# Patient Record
Sex: MALE | Race: WHITE
[De-identification: names, ages, dates, MRNs, and addresses within clinical notes are randomized per-mention and may not be internally consistent; named-entity substitution may affect disease eponyms.]

---

## 2018-06-09 ENCOUNTER — HOSPITAL ENCOUNTER (OUTPATIENT)
Dept: HOSPITAL 61 - ER | Age: 63
Setting detail: OBSERVATION
LOS: 2 days | Discharge: HOME | End: 2018-06-11
Attending: HOSPITALIST | Admitting: HOSPITALIST
Payer: COMMERCIAL

## 2018-06-09 DIAGNOSIS — Z82.49: ICD-10-CM

## 2018-06-09 DIAGNOSIS — E03.9: ICD-10-CM

## 2018-06-09 DIAGNOSIS — K76.0: ICD-10-CM

## 2018-06-09 DIAGNOSIS — N39.0: ICD-10-CM

## 2018-06-09 DIAGNOSIS — N17.0: Primary | ICD-10-CM

## 2018-06-09 DIAGNOSIS — N12: ICD-10-CM

## 2018-06-09 DIAGNOSIS — M19.90: ICD-10-CM

## 2018-06-09 DIAGNOSIS — Z83.3: ICD-10-CM

## 2018-06-09 DIAGNOSIS — I10: ICD-10-CM

## 2018-06-09 DIAGNOSIS — Z79.899: ICD-10-CM

## 2018-06-09 DIAGNOSIS — E46: ICD-10-CM

## 2018-06-09 LAB
ADD MAN DIFF?: NO
ALBUMIN SERPL-MCNC: 3.5 G/DL (ref 3.4–5)
ALBUMIN/GLOB SERPL: 0.7 {RATIO} (ref 1–1.7)
ALP SERPL-CCNC: 138 U/L (ref 46–116)
ALT (SGPT): 26 U/L (ref 16–63)
ANION GAP SERPL CALC-SCNC: 8 MMOL/L (ref 6–14)
AST SERPL-CCNC: 22 U/L (ref 15–37)
BACTERIA,URINE: (no result) /HPF
BASO #: 0 X10^3/UL (ref 0–0.2)
BASO %: 0 % (ref 0–3)
BILIRUBIN,URINE: (no result)
BLOOD UREA NITROGEN: 22 MG/DL (ref 8–26)
BUN/CREAT SERPL: 16 (ref 6–20)
CALCIUM: 8.8 MG/DL (ref 8.5–10.1)
CHLORIDE: 100 MMOL/L (ref 98–107)
CLARITY,URINE: (no result)
CO2 SERPL-SCNC: 29 MMOL/L (ref 21–32)
COLOR,URINE: (no result)
CREAT SERPL-MCNC: 1.4 MG/DL (ref 0.7–1.3)
EOS #: 0 X10^3/UL (ref 0–0.7)
EOS %: 0 % (ref 0–3)
GFR SERPLBLD BASED ON 1.73 SQ M-ARVRAT: 51.5 ML/MIN
GLOBULIN SER-MCNC: 5 G/DL (ref 2.2–3.8)
GLUCOSE SERPL-MCNC: 133 MG/DL (ref 70–99)
GLUCOSE,URINE: NEGATIVE MG/DL
HCG SERPL-ACNC: 11.5 X10^3/UL (ref 4–11)
HEMATOCRIT: 39.6 % (ref 39–53)
HEMOGLOBIN: 13.7 G/DL (ref 13–17.5)
LIPASE: 94 U/L (ref 73–393)
LYMPH #: 0.9 X10^3/UL (ref 1–4.8)
LYMPH %: 8 % (ref 24–48)
MEAN CORPUSCULAR HEMOGLOBIN: 29 PG (ref 25–35)
MEAN CORPUSCULAR HGB CONC: 35 G/DL (ref 31–37)
MEAN CORPUSCULAR VOLUME: 84 FL (ref 79–100)
MONO #: 0.8 X10^3/UL (ref 0–1.1)
MONO %: 7 % (ref 0–9)
NEUT #: 9.8 X10^3UL (ref 1.8–7.7)
NEUT %: 85 % (ref 31–73)
NITRITE,URINE: NEGATIVE
PH,URINE: 5.5
PLATELET COUNT: 241 X10^3/UL (ref 140–400)
POTASSIUM SERPL-SCNC: 3.8 MMOL/L (ref 3.5–5.1)
PROTEIN,URINE: >=300 MG/DL
RBC,URINE: (no result) /HPF (ref 0–2)
RED BLOOD COUNT: 4.74 X10^6/UL (ref 4.3–5.7)
RED CELL DISTRIBUTION WIDTH: 13.5 % (ref 11.5–14.5)
SODIUM: 137 MMOL/L (ref 136–145)
SPECIFIC GRAVITY,URINE: 1.02
TOTAL BILIRUBIN: 1.6 MG/DL (ref 0.2–1)
TOTAL PROTEIN: 8.5 G/DL (ref 6.4–8.2)
UROBILINOGEN,URINE: 1 MG/DL
WBC,URINE: (no result) /HPF (ref 0–4)

## 2018-06-09 PROCEDURE — 80053 COMPREHEN METABOLIC PANEL: CPT

## 2018-06-09 PROCEDURE — 96365 THER/PROPH/DIAG IV INF INIT: CPT

## 2018-06-09 PROCEDURE — 74018 RADEX ABDOMEN 1 VIEW: CPT

## 2018-06-09 PROCEDURE — 87186 SC STD MICRODIL/AGAR DIL: CPT

## 2018-06-09 PROCEDURE — 96366 THER/PROPH/DIAG IV INF ADDON: CPT

## 2018-06-09 PROCEDURE — 81001 URINALYSIS AUTO W/SCOPE: CPT

## 2018-06-09 PROCEDURE — 83690 ASSAY OF LIPASE: CPT

## 2018-06-09 PROCEDURE — 36415 COLL VENOUS BLD VENIPUNCTURE: CPT

## 2018-06-09 PROCEDURE — 74022 RADEX COMPL AQT ABD SERIES: CPT

## 2018-06-09 PROCEDURE — 74177 CT ABD & PELVIS W/CONTRAST: CPT

## 2018-06-09 PROCEDURE — 84443 ASSAY THYROID STIM HORMONE: CPT

## 2018-06-09 PROCEDURE — 80048 BASIC METABOLIC PNL TOTAL CA: CPT

## 2018-06-09 PROCEDURE — 96375 TX/PRO/DX INJ NEW DRUG ADDON: CPT

## 2018-06-09 PROCEDURE — 85025 COMPLETE CBC W/AUTO DIFF WBC: CPT

## 2018-06-09 PROCEDURE — 87086 URINE CULTURE/COLONY COUNT: CPT

## 2018-06-09 PROCEDURE — 99285 EMERGENCY DEPT VISIT HI MDM: CPT

## 2018-06-09 PROCEDURE — 96361 HYDRATE IV INFUSION ADD-ON: CPT

## 2018-06-09 RX ADMIN — CIPROFLOXACIN 1 MLS/HR: 2 INJECTION, SOLUTION INTRAVENOUS at 16:36

## 2018-06-09 RX ADMIN — IOHEXOL 1 ML: 300 INJECTION, SOLUTION INTRAVENOUS at 15:13

## 2018-06-09 RX ADMIN — BACITRACIN 1 MLS/HR: 5000 INJECTION, POWDER, FOR SOLUTION INTRAMUSCULAR at 17:12

## 2018-06-10 LAB
ADD MAN DIFF?: NO
ALBUMIN SERPL-MCNC: 3 G/DL (ref 3.4–5)
ALBUMIN/GLOB SERPL: 0.6 {RATIO} (ref 1–1.7)
ALP SERPL-CCNC: 124 U/L (ref 46–116)
ALT (SGPT): 22 U/L (ref 16–63)
ANION GAP SERPL CALC-SCNC: 7 MMOL/L (ref 6–14)
AST SERPL-CCNC: 20 U/L (ref 15–37)
BASO #: 0 X10^3/UL (ref 0–0.2)
BASO %: 0 % (ref 0–3)
BLOOD UREA NITROGEN: 29 MG/DL (ref 8–26)
BUN/CREAT SERPL: 32 (ref 6–20)
CALCIUM: 8.6 MG/DL (ref 8.5–10.1)
CHLORIDE: 103 MMOL/L (ref 98–107)
CO2 SERPL-SCNC: 28 MMOL/L (ref 21–32)
CREAT SERPL-MCNC: 0.9 MG/DL (ref 0.7–1.3)
EOS #: 0.1 X10^3/UL (ref 0–0.7)
EOS %: 1 % (ref 0–3)
GFR SERPLBLD BASED ON 1.73 SQ M-ARVRAT: 85.8 ML/MIN
GLOBULIN SER-MCNC: 4.8 G/DL (ref 2.2–3.8)
GLUCOSE SERPL-MCNC: 95 MG/DL (ref 70–99)
HCG SERPL-ACNC: 8 X10^3/UL (ref 4–11)
HEMATOCRIT: 37.3 % (ref 39–53)
HEMOGLOBIN: 12.7 G/DL (ref 13–17.5)
LYMPH #: 2.1 X10^3/UL (ref 1–4.8)
LYMPH %: 26 % (ref 24–48)
MEAN CORPUSCULAR HEMOGLOBIN: 29 PG (ref 25–35)
MEAN CORPUSCULAR HGB CONC: 34 G/DL (ref 31–37)
MEAN CORPUSCULAR VOLUME: 85 FL (ref 79–100)
MONO #: 0.8 X10^3/UL (ref 0–1.1)
MONO %: 10 % (ref 0–9)
NEUT #: 5 X10^3UL (ref 1.8–7.7)
NEUT %: 63 % (ref 31–73)
PLATELET COUNT: 225 X10^3/UL (ref 140–400)
POTASSIUM SERPL-SCNC: 3.7 MMOL/L (ref 3.5–5.1)
RED BLOOD COUNT: 4.37 X10^6/UL (ref 4.3–5.7)
RED CELL DISTRIBUTION WIDTH: 13.7 % (ref 11.5–14.5)
SODIUM: 138 MMOL/L (ref 136–145)
TOTAL BILIRUBIN: 1.2 MG/DL (ref 0.2–1)
TOTAL PROTEIN: 7.8 G/DL (ref 6.4–8.2)

## 2018-06-10 RX ADMIN — BACITRACIN 1 MLS/HR: 5000 INJECTION, POWDER, FOR SOLUTION INTRAMUSCULAR at 00:15

## 2018-06-10 RX ADMIN — BACITRACIN 1 MLS/HR: 5000 INJECTION, POWDER, FOR SOLUTION INTRAMUSCULAR at 11:35

## 2018-06-10 RX ADMIN — POLYETHYLENE GLYCOL 3350 1 GM: 17 POWDER, FOR SOLUTION ORAL at 11:34

## 2018-06-10 RX ADMIN — CEFTRIAXONE 1 GM: 1 INJECTION, POWDER, FOR SOLUTION INTRAMUSCULAR; INTRAVENOUS at 12:48

## 2018-06-10 RX ADMIN — CIPROFLOXACIN 1 MLS/HR: 2 INJECTION, SOLUTION INTRAVENOUS at 08:35

## 2018-06-10 RX ADMIN — Medication 1 CAP: at 22:02

## 2018-06-10 RX ADMIN — PANTOPRAZOLE SODIUM 1 MG: 40 TABLET, DELAYED RELEASE ORAL at 15:56

## 2018-06-10 RX ADMIN — LEVOTHYROXINE SODIUM 1 MCG: 88 TABLET ORAL at 12:47

## 2018-06-10 RX ADMIN — Medication 1 CAP: at 15:56

## 2018-06-10 RX ADMIN — LISINOPRIL 1 MG: 20 TABLET ORAL at 12:47

## 2018-06-10 RX ADMIN — CITROMA MAGNESIUM CITRATE 1 ML: 1.75 LIQUID ORAL at 11:34

## 2018-06-11 LAB
ADD MAN DIFF?: NO
ANION GAP SERPL CALC-SCNC: 7 MMOL/L (ref 6–14)
BASO #: 0 X10^3/UL (ref 0–0.2)
BASO %: 1 % (ref 0–3)
BLOOD UREA NITROGEN: 24 MG/DL (ref 8–26)
CALCIUM: 8.1 MG/DL (ref 8.5–10.1)
CHLORIDE: 103 MMOL/L (ref 98–107)
CO2 SERPL-SCNC: 27 MMOL/L (ref 21–32)
CREAT SERPL-MCNC: 0.7 MG/DL (ref 0.7–1.3)
EOS #: 0.2 X10^3/UL (ref 0–0.7)
EOS %: 3 % (ref 0–3)
GFR SERPLBLD BASED ON 1.73 SQ M-ARVRAT: 114.6 ML/MIN
GLUCOSE SERPL-MCNC: 95 MG/DL (ref 70–99)
HCG SERPL-ACNC: 5.9 X10^3/UL (ref 4–11)
HEMATOCRIT: 34.5 % (ref 39–53)
HEMOGLOBIN: 11.8 G/DL (ref 13–17.5)
LYMPH #: 1.8 X10^3/UL (ref 1–4.8)
LYMPH %: 31 % (ref 24–48)
MEAN CORPUSCULAR HEMOGLOBIN: 29 PG (ref 25–35)
MEAN CORPUSCULAR HGB CONC: 34 G/DL (ref 31–37)
MEAN CORPUSCULAR VOLUME: 85 FL (ref 79–100)
MONO #: 0.6 X10^3/UL (ref 0–1.1)
MONO %: 10 % (ref 0–9)
NEUT #: 3.3 X10^3UL (ref 1.8–7.7)
NEUT %: 56 % (ref 31–73)
PLATELET COUNT: 195 X10^3/UL (ref 140–400)
POTASSIUM SERPL-SCNC: 3.8 MMOL/L (ref 3.5–5.1)
RED BLOOD COUNT: 4.07 X10^6/UL (ref 4.3–5.7)
RED CELL DISTRIBUTION WIDTH: 13.9 % (ref 11.5–14.5)
SODIUM: 137 MMOL/L (ref 136–145)
THYROID STIM HORMONE (TSH): 5.76 UIU/ML (ref 0.36–3.74)

## 2018-06-11 RX ADMIN — BACITRACIN 1 MLS/HR: 5000 INJECTION, POWDER, FOR SOLUTION INTRAMUSCULAR at 02:55

## 2018-06-11 RX ADMIN — POLYETHYLENE GLYCOL 3350 1 GM: 17 POWDER, FOR SOLUTION ORAL at 08:55

## 2018-06-11 RX ADMIN — Medication 1 CAP: at 08:55

## 2018-06-11 RX ADMIN — LISINOPRIL 1 MG: 20 TABLET ORAL at 08:56

## 2018-06-11 RX ADMIN — PANTOPRAZOLE SODIUM 1 MG: 40 TABLET, DELAYED RELEASE ORAL at 08:55

## 2018-06-11 RX ADMIN — LEVOTHYROXINE SODIUM 1 MCG: 88 TABLET ORAL at 05:16

## 2019-12-02 ENCOUNTER — HOSPITAL ENCOUNTER (INPATIENT)
Dept: HOSPITAL 61 - ER | Age: 64
Discharge: HOME | DRG: 304 | End: 2019-12-02
Attending: FAMILY MEDICINE | Admitting: FAMILY MEDICINE
Payer: COMMERCIAL

## 2019-12-02 VITALS — SYSTOLIC BLOOD PRESSURE: 142 MMHG | DIASTOLIC BLOOD PRESSURE: 68 MMHG

## 2019-12-02 VITALS — SYSTOLIC BLOOD PRESSURE: 158 MMHG | DIASTOLIC BLOOD PRESSURE: 74 MMHG

## 2019-12-02 VITALS — DIASTOLIC BLOOD PRESSURE: 81 MMHG | SYSTOLIC BLOOD PRESSURE: 168 MMHG

## 2019-12-02 VITALS — BODY MASS INDEX: 33.81 KG/M2 | WEIGHT: 215.4 LBS | HEIGHT: 67 IN

## 2019-12-02 VITALS — SYSTOLIC BLOOD PRESSURE: 159 MMHG | DIASTOLIC BLOOD PRESSURE: 82 MMHG

## 2019-12-02 VITALS — DIASTOLIC BLOOD PRESSURE: 74 MMHG | SYSTOLIC BLOOD PRESSURE: 153 MMHG

## 2019-12-02 VITALS — SYSTOLIC BLOOD PRESSURE: 146 MMHG | DIASTOLIC BLOOD PRESSURE: 82 MMHG

## 2019-12-02 VITALS — SYSTOLIC BLOOD PRESSURE: 151 MMHG | DIASTOLIC BLOOD PRESSURE: 80 MMHG

## 2019-12-02 DIAGNOSIS — E03.9: ICD-10-CM

## 2019-12-02 DIAGNOSIS — I16.1: Primary | ICD-10-CM

## 2019-12-02 DIAGNOSIS — Z91.14: ICD-10-CM

## 2019-12-02 DIAGNOSIS — K21.9: ICD-10-CM

## 2019-12-02 DIAGNOSIS — E66.9: ICD-10-CM

## 2019-12-02 DIAGNOSIS — M19.90: ICD-10-CM

## 2019-12-02 DIAGNOSIS — Z86.79: ICD-10-CM

## 2019-12-02 DIAGNOSIS — Z79.82: ICD-10-CM

## 2019-12-02 DIAGNOSIS — R42: ICD-10-CM

## 2019-12-02 DIAGNOSIS — E83.41: ICD-10-CM

## 2019-12-02 DIAGNOSIS — Z82.3: ICD-10-CM

## 2019-12-02 DIAGNOSIS — I10: ICD-10-CM

## 2019-12-02 DIAGNOSIS — G93.41: ICD-10-CM

## 2019-12-02 DIAGNOSIS — E83.42: ICD-10-CM

## 2019-12-02 DIAGNOSIS — Z91.19: ICD-10-CM

## 2019-12-02 DIAGNOSIS — Z83.3: ICD-10-CM

## 2019-12-02 LAB
AGAP ISTAT: 16 MMOL/L (ref 6–14)
ALBUMIN SERPL-MCNC: 3.6 G/DL (ref 3.4–5)
ALBUMIN/GLOB SERPL: 0.9 {RATIO} (ref 1–1.7)
ALP SERPL-CCNC: 148 U/L (ref 46–116)
ALT SERPL-CCNC: 29 U/L (ref 16–63)
AMPHETAMINE/METHAMPHETAMINE: (no result)
ANION GAP SERPL CALC-SCNC: 9 MMOL/L (ref 6–14)
APTT BLD: 28 SEC (ref 24–38)
APTT PPP: YELLOW S
AST SERPL-CCNC: 28 U/L (ref 15–37)
BACTERIA #/AREA URNS HPF: 0 /HPF
BARBITURATES UR-MCNC: (no result) UG/ML
BASOPHILS # BLD AUTO: 0 X10^3/UL (ref 0–0.2)
BASOPHILS NFR BLD: 1 % (ref 0–3)
BENZODIAZ UR-MCNC: (no result) UG/L
BILIRUB SERPL-MCNC: 0.7 MG/DL (ref 0.2–1)
BILIRUB UR QL STRIP: NEGATIVE
BUN ISTAT: 9 MG/DL (ref 8–26)
BUN SERPL-MCNC: 9 MG/DL (ref 8–26)
BUN/CREAT SERPL: 13 (ref 6–20)
CALCIUM SERPL-MCNC: 8.3 MG/DL (ref 8.5–10.1)
CANNABINOIDS UR-MCNC: (no result) UG/L
CHLORIDE SERPL-SCNC: 94 MMOL/L (ref 98–110)
CHLORIDE SERPL-SCNC: 96 MMOL/L (ref 98–107)
CHOLEST SERPL-MCNC: 114 MG/DL (ref 0–200)
CHOLEST/HDLC SERPL: 3.4 {RATIO}
CO2 BLD-SCNC: 28 MMOL/L (ref 23–32)
CO2 SERPL-SCNC: 28 MMOL/L (ref 21–32)
COCAINE UR-MCNC: (no result) NG/ML
CREAT SERPL-MCNC: 0.7 MG/DL (ref 0.7–1.3)
CREATININE ISTAT: 0.6 MG/DL (ref 0.5–1.4)
EOSINOPHIL NFR BLD: 0.1 X10^3/UL (ref 0–0.7)
EOSINOPHIL NFR BLD: 2 % (ref 0–3)
ERYTHROCYTE [DISTWIDTH] IN BLOOD BY AUTOMATED COUNT: 13.4 % (ref 11.5–14.5)
FIBRINOGEN PPP-MCNC: CLEAR MG/DL
GFR SERPLBLD BASED ON 1.73 SQ M-ARVRAT: 113.9 ML/MIN
GLOBULIN SER-MCNC: 4.2 G/DL (ref 2.2–3.8)
GLUCOSE BLD-MCNC: 156 MG/DL (ref 70–99)
GLUCOSE SERPL-MCNC: 158 MG/DL (ref 70–99)
HCT VFR BLD AUTO: 42 % (ref 37–52)
HCT VFR BLD CALC: 40.6 % (ref 39–53)
HDLC SERPL-MCNC: 34 MG/DL (ref 40–60)
HEMOGLOBIN ISTAT: 14.3 G/DL (ref 14–18)
HGB BLD-MCNC: 13.8 G/DL (ref 13–17.5)
HYALINE CASTS #/AREA URNS LPF: (no result) /HPF
ION CA ISTAT: 1.04 MMOL/L (ref 1.13–1.32)
LDLC: 67 MG/DL (ref 0–100)
LYMPHOCYTES # BLD: 1.4 X10^3/UL (ref 1–4.8)
LYMPHOCYTES NFR BLD AUTO: 25 % (ref 24–48)
MAGNESIUM SERPL-MCNC: 4.8 MG/DL (ref 1.8–2.4)
MCH RBC QN AUTO: 29 PG (ref 25–35)
MCHC RBC AUTO-ENTMCNC: 34 G/DL (ref 31–37)
MCV RBC AUTO: 85 FL (ref 79–100)
METHADONE SERPL-MCNC: (no result) NG/ML
MONO #: 0.7 X10^3/UL (ref 0–1.1)
MONOCYTES NFR BLD: 12 % (ref 0–9)
NEUT #: 3.3 X10^3/UL (ref 1.8–7.7)
NEUTROPHILS NFR BLD AUTO: 61 % (ref 31–73)
NITRITE UR QL STRIP: NEGATIVE
OPIATES UR-MCNC: (no result) NG/ML
PCP SERPL-MCNC: (no result) MG/DL
PH UR STRIP: 7.5 [PH]
PLATELET # BLD AUTO: 177 X10^3/UL (ref 140–400)
POTASSIUM BLD-SCNC: 3.5 MMOL/L (ref 3.5–5)
POTASSIUM SERPL-SCNC: 3.6 MMOL/L (ref 3.5–5.1)
PROT SERPL-MCNC: 7.8 G/DL (ref 6.4–8.2)
PROT UR STRIP-MCNC: 30 MG/DL
PROTHROMBIN TIME: 13 SEC (ref 11.7–14)
RBC # BLD AUTO: 4.79 X10^6/UL (ref 4.3–5.7)
RBC #/AREA URNS HPF: (no result) /HPF (ref 0–2)
SODIUM SERPL-SCNC: 133 MMOL/L (ref 136–145)
SODIUM SERPL-SCNC: 134 MMOL/L (ref 135–145)
SQUAMOUS #/AREA URNS LPF: (no result) /LPF
TRIGL SERPL-MCNC: 63 MG/DL (ref 0–150)
UROBILINOGEN UR-MCNC: 0.2 MG/DL
VLDLC: 13 MG/DL (ref 0–40)
WBC # BLD AUTO: 5.5 X10^3/UL (ref 4–11)
WBC #/AREA URNS HPF: (no result) /HPF (ref 0–4)

## 2019-12-02 PROCEDURE — 80307 DRUG TEST PRSMV CHEM ANLYZR: CPT

## 2019-12-02 PROCEDURE — 70450 CT HEAD/BRAIN W/O DYE: CPT

## 2019-12-02 PROCEDURE — 70498 CT ANGIOGRAPHY NECK: CPT

## 2019-12-02 PROCEDURE — 84443 ASSAY THYROID STIM HORMONE: CPT

## 2019-12-02 PROCEDURE — G0378 HOSPITAL OBSERVATION PER HR: HCPCS

## 2019-12-02 PROCEDURE — 70496 CT ANGIOGRAPHY HEAD: CPT

## 2019-12-02 PROCEDURE — 82962 GLUCOSE BLOOD TEST: CPT

## 2019-12-02 PROCEDURE — 85610 PROTHROMBIN TIME: CPT

## 2019-12-02 PROCEDURE — 70551 MRI BRAIN STEM W/O DYE: CPT

## 2019-12-02 PROCEDURE — 85730 THROMBOPLASTIN TIME PARTIAL: CPT

## 2019-12-02 PROCEDURE — 36415 COLL VENOUS BLD VENIPUNCTURE: CPT

## 2019-12-02 PROCEDURE — 93005 ELECTROCARDIOGRAM TRACING: CPT

## 2019-12-02 PROCEDURE — 71045 X-RAY EXAM CHEST 1 VIEW: CPT

## 2019-12-02 PROCEDURE — 80053 COMPREHEN METABOLIC PANEL: CPT

## 2019-12-02 PROCEDURE — 81001 URINALYSIS AUTO W/SCOPE: CPT

## 2019-12-02 PROCEDURE — 80047 BASIC METABLC PNL IONIZED CA: CPT

## 2019-12-02 PROCEDURE — 84484 ASSAY OF TROPONIN QUANT: CPT

## 2019-12-02 PROCEDURE — 80061 LIPID PANEL: CPT

## 2019-12-02 PROCEDURE — 83735 ASSAY OF MAGNESIUM: CPT

## 2019-12-02 PROCEDURE — 85025 COMPLETE CBC W/AUTO DIFF WBC: CPT

## 2019-12-02 PROCEDURE — 83880 ASSAY OF NATRIURETIC PEPTIDE: CPT

## 2019-12-02 RX ADMIN — ONDANSETRON PRN MG: 2 INJECTION INTRAMUSCULAR; INTRAVENOUS at 14:29

## 2019-12-02 RX ADMIN — ONDANSETRON PRN MG: 2 INJECTION INTRAMUSCULAR; INTRAVENOUS at 18:05

## 2019-12-02 NOTE — RAD
EXAM: AP View of the chest

 

DATE: 12/2/2019 5:09 AM

 

INDICATION:

 

COMPARISON: No Prior

 

FINDINGS:

 

Surgical clips are seen at the base of the right neck. Mild to moderate 

cardiomegaly. Atherosclerotic calcifications of the tortuous aorta are 

seen. 

 

Old/healed right clavicle fracture changes are seen.

 

Mediastinal and hilar contours are normal.

 

No focal parenchymal airspace opacity.

 

No pleural effusion or pneumothorax.

 

IMPRESSION:

1.  No radiographic evidence for acute cardiopulmonary process.

 

Electronically signed by: Cj Pelayo MD (12/2/2019 6:32 AM) [facility] [place]

## 2019-12-02 NOTE — RAD
CLINICAL HISTORY: Dizziness 

 

COMPARISON: None available.

 

TECHNIQUE: CT angiogram of the head and neck was performed following the 

administration of IV contrast. Multiplanar reconstructed images were 

obtained including 3D reconstructed images performed on an independent 

work station. Stenosis if present in the carotid arteries were measured 

using NASCET criteria. 

 

PQRS compliance statement - One or more of the following individualized 

dose reduction techniques were utilized for this study:

1.  Automated exposure control

2.  Adjustment of the mA and/or kV according to patient size

3.  Use of iterative reconstruction technique

 

FINDINGS: 

 

CTA of the intracranial circulation reveals normal appearing distal 

internal carotid arteries including the distal cervical, petrous, 

cavernous and supraclinoid portions.

 

The anterior cerebral arteries are  well visualized and without evidence 

of stenosis or occlusion.

 

The middle cerebral  arteries are well visualized and without evidence of 

stenosis or occlusion.

 

The posterior cerebral arteries are well visualized and without evidence 

of stenosis or occlusion.

 

The vertebral basilar system  is normal with no evidence of stenosis or 

occlusion.

 

In the neck, the origins of the great vessels are unremarkable. 

 

The common carotid arteries, bilaterally are normal with no evidence of 

significant stenosis or occlusion.

 

The internal carotid arteries are normal bilaterally with no evidence of 

stenosis.

 

The right vertebral artery is diminutive, particularly proximally, 

possibly physiologic for this patient or postsurgical. Occlusion would 

also have this appearance.. The left vertebral artery is dominant.

 

A few mildly prominent AP window lymph nodes are seen.

 

IMPRESSION:

1.  Diminutive appearance of the proximal right vertebral artery is 

nonspecific. This may be physiologic for this patient or postsurgical 

given the surgical clips at the base of the right neck. Alternatively, 

occlusion with thrombus of the proximal right vertebral artery would also 

have this appearance.

2.  Otherwise the left vertebral artery and carotid arteries in the neck 

are widely patent.

3.  No evidence for high-grade stenosis or occlusion of the intracranial 

circulation.

 

Electronically signed by: Cj Pelayo MD (12/2/2019 6:19 AM) [facility]

## 2019-12-02 NOTE — NUR
SS following for discharge planning. SS reviewed pt chart. Pt is from home with spouse and 
is currently on room air. Discharge order on the chart for home with self care.

## 2019-12-02 NOTE — EKG
[facility]

              [address] [place] [address]

Test Date:    2019               Test Time:    05:12:21

Pat Name:     BONIFACIO CLARK            Department:   

Patient ID:   [id number]           Room:          

Gender:       M                        Technician:   

:          [id number]               Requested By: JB MARTINEZ

Order Number: [id number]           Reading MD:     

                                 Measurements

Intervals                              Axis          

Rate:         78                       P:            31

DE:           184                      QRS:          -18

QRSD:         124                      T:            20

QT:           428                                    

QTc:          492                                    

                           Interpretive Statements

SINUS RHYTHM

ATRIAL PREMATURE COMPLEX(ES)

LEFTWARD AXIS

RIGHT BUNDLE BRANCH BLOCK

QRS(T) CONTOUR ABNORMALITY

CONSIDER INFERIOR MYOCARDIAL DAMAGE

ABNORMAL ECG

RI6.01

No previous ECG available for comparison

## 2019-12-02 NOTE — PDOC1
History and Physical


Date of Admission


Date of Admission


DATE: 12/2/19 


TIME: 11:48





Source


Source:  Chart review, Patient





History of Present Illness


History of Present Illness


 Mr. Hays is a 64-year-old male admit for accel htn,  dizzyness,  he called  

EMS at 4 AM  after went to the bathroom and started having new dizziness, 

lightheadedness, and nausea.  He had awoken because he could not sleep and had 

onset of symptoms,.


 .  Patient denies chest pain, shortness of breath. His blood pressure at that 

time was 210/110


blood pressure was elevated here,but improved, then given IV hydralazine 10 mg 

as well as some Zofran.


due to  sudden onset of dizziness as well as nausea, code stroke was called.   

symptoms much improvd





Past Medical History


Cardiovascular:  HTN


Pulmonary:  No pertinent hx


CENTRAL NERVOUS SYSTEM:  Other


GI:  No pertinent hx


Heme/Onc:  No pertinent hx


Hepatobiliary:  No pertinent hx


Psych:  No pertinent hx


Musculoskeletal:  Osteoarthritis, Other


Rheumatologic:  No pertinent hx


Infectious disease:  No pertinent hx


Renal/:  No pertinent hx


Endocrine:  Hypothyroidism





Past Surgical History


Past Surgical History:  Other





Family History


Family History:  Diabetes, Heart Disease





Social History


Smoke:  No


ALCOHOL:  none


Drugs:  None





Current Problem List


Problem List


Problems


Medical Problems:


(1) Dizziness


Status: Acute  





(2) HTN (hypertension)


Status: Acute  











Current Medications


Current Medications





Current Medications


Aspirin (Children'S Aspirin) 324 mg 1X  ONCE PO ;  Start 12/2/19 at 05:30;  Stop

12/2/19 at 05:31;  Status DC


Nitroglycerin (Nitrostat) 0.4 mg PRN Q5MIN  PRN SL CP RATING > 1/10;  Start 

12/2/19 at 05:15;  Stop 12/3/19 at 05:14


Morphine Sulfate (Morphine Sulfate) 2 mg PRN Q15MIN  PRN IV/SQ PAIN GREATER THAN

3/10;  Start 12/2/19 at 05:30;  Stop 12/3/19 at 05:29


Sodium Chloride 1,000 ml @  1,000 mls/hr Q1H IV  Last administered on 12/2/19at 

05:20;  Start 12/2/19 at 05:09;  Stop 12/2/19 at 06:08;  Status DC


Ondansetron HCl (Zofran) 4 mg 1X  ONCE IV  Last administered on 12/2/19at 05:24;

 Start 12/2/19 at 05:30;  Stop 12/2/19 at 05:31;  Status DC


Hydralazine HCl (Apresoline Inj) 20 mg STK-MED ONCE .ROUTE ;  Start 12/2/19 at 

05:31;  Stop 12/2/19 at 05:31;  Status DC


Hydralazine HCl (Apresoline Inj) 10 mg 1X  ONCE IVP  Last administered on 

12/2/19at 05:30;  Start 12/2/19 at 05:45;  Stop 12/2/19 at 05:47;  Status DC


Metoclopramide HCl (Reglan Vial) 10 mg 1X  ONCE IVP  Last administered on 

12/2/19at 06:06;  Start 12/2/19 at 06:15;  Stop 12/2/19 at 06:16;  Status DC


Ondansetron HCl (Zofran) 4 mg PRN Q8HRS  PRN IV NAUSEA/VOMITING;  Start 12/2/19 

at 06:15;  Stop 12/3/19 at 06:14


Iohexol (Omnipaque 350 Mg/ml) 100 ml 1X  ONCE IV  Last administered on 12/2/19at

06:23;  Start 12/2/19 at 06:30;  Stop 12/2/19 at 06:31;  Status DC


Info (CONTRAST GIVEN -- Rx MONITORING) 1 each PRN DAILY  PRN MC SEE COMMENTS;  

Start 12/2/19 at 06:30;  Stop 12/4/19 at 06:29


Aspirin (Children'S Aspirin) 81 mg 1X  ONCE PO ;  Start 12/2/19 at 07:00;  Stop 

12/2/19 at 07:01;  Status DC


Influenza Virus Vaccine Quadrival (Afluria Quad 2019-20 (3yr Up) Syringe) 0.5 ml

ONCE ONCE VAX IM ;  Start 12/2/19 at 09:30;  Stop 12/2/19 at 09:31;  Status DC


Levothyroxine Sodium (Synthroid) 88 mcg DAILYAC PO ;  Start 12/2/19 at 12:00


Lisinopril (Prinivil) 40 mg DAILY PO ;  Start 12/2/19 at 12:00


Hydrochlorothiazide (Microzide) 12.5 mg DAILY PO ;  Start 12/2/19 at 12:00


Aspirin (Stephie Aspirin) 325 mg DAILYWBKFT PO ;  Start 12/2/19 at 12:00





Active Scripts


Active


Cipro (Ciprofloxacin Hcl) 500 Mg Tablet 1 Tab PO BID


Hydrochlorothiazide Tablet (Hydrochlorothiazide) 12.5 Mg Tablet 1 Tab PO DAILY


Lisinopril 40 Mg Tablet 40 Mg PO DAILY


Reported


Protonix (Pantoprazole Sodium) 20 Mg Tablet.dr 40 Mg PO BID


Levothyroxine Sodium 88 Mcg Tablet 88 Mcg PO DAILYAC





Allergies


Allergies:  


Coded Allergies:  


     No Known Drug Allergies (Unverified , 4/28/16)





ROS


General:  No: Chills, Night Sweats, Fatigue, Malaise, Appetite, Other


PSYCHOLOGICAL ROS:  No: Anxiety, Behavioral Disorder, Concentration difficultie,

Decreased libido, Depression, Disorientation, Hallucinations, Hostility, 

Irritablity, Memory difficulties, Mood Swings, Obsessive thoughts, Physical 

abuse, Sexual abuse, Sleep disturbances, Suicidal ideation, Other


Eyes:  No Blurry vision, No Decreased vision, No Double vision, No Dry eyes, No 

Excessive tearing, No Eye Pain, No Itchy Eyes, No Loss of vision, No 

Photophobia, No Scotomata, No Uses contacts, No Uses glasses, No Other


HEENT:  No: Heacaches, Visual Changes, Hearing change, Nasal congestion, Nasal 

discharge, Oral lesions, Sinus pain, Sore Throat, Epistaxis, Sneezing, Snoring, 

Tinnitus, Vertigo, Vocal changes, Other


Respiratory:  No: Cough, Hemoptysis, Orthopnea, Pleuritic Pain, Shortness of 

breath, SOB with excertion, Sputum Changes, Stridor, Tachypnea, Wheezing, Other


Cardiovascular:  No Chest Pain, No Palpitations, No Orthopnea, No Paroxysmal 

Noc. Dyspnea, No Edema, No Lt Headedness, No Other


Gastrointestinal:  Yes Nausea


Musculoskeletal:  Yes Joint Stiffness


Neurological:  Yes Dizziness


Skin:  No Dry Skin, No Eczema, No Hair Changes, No Lumps, No Mole Changes, No 

Mottling, No Nail Changes, No Pruritus, No Rash, No Skin Lesion Changes, No 

Other, No Acne





Physical Exam


General:  Alert, Oriented X3, Cooperative, No acute distress


HEENT:  Atraumatic


Lungs:  Clear to auscultation


Heart:  S1S2, RRR


Abdomen:  Normal bowel sounds, Soft


Rectal Exam:  not examined


Extremities:  No clubbing


Skin:  No rashes


Neuro:  Normal gait, Normal speech, Normal tone, Sensation intact


Psych/Mental Status:  Mental status NL, Mood NL





Vitals


Vitals





Vital Signs








  Date Time  Temp Pulse Resp B/P (MAP) Pulse Ox O2 Delivery O2 Flow Rate FiO2


 


12/2/19 08:00      Room Air  


 


12/2/19 07:00 97.2 70 18 151/80 (103) 97   





 97.2       











Labs


Labs





Laboratory Tests








Test


 12/2/19


05:15 12/2/19


05:21 12/2/19


05:27 12/2/19


06:27


 


White Blood Count


 5.5 x10^3/uL


(4.0-11.0) 


 


 





 


Red Blood Count


 4.79 x10^6/uL


(4.30-5.70) 


 


 





 


Hemoglobin


 13.8 g/dL


(13.0-17.5) 


 


 





 


Hematocrit


 40.6 %


(39.0-53.0) 


 


 





 


Mean Corpuscular Volume 85 fL ()    


 


Mean Corpuscular Hemoglobin 29 pg (25-35)    


 


Mean Corpuscular Hemoglobin


Concent 34 g/dL


(31-37) 


 


 





 


Red Cell Distribution Width


 13.4 %


(11.5-14.5) 


 


 





 


Platelet Count


 177 x10^3/uL


(140-400) 


 


 





 


Neutrophils (%) (Auto) 61 % (31-73)    


 


Lymphocytes (%) (Auto) 25 % (24-48)    


 


Monocytes (%) (Auto) 12 % (0-9)    


 


Eosinophils (%) (Auto) 2 % (0-3)    


 


Basophils (%) (Auto) 1 % (0-3)    


 


Neutrophils # (Auto)


 3.3 x10^3/uL


(1.8-7.7) 


 


 





 


Lymphocytes # (Auto)


 1.4 x10^3/uL


(1.0-4.8) 


 


 





 


Monocytes # (Auto)


 0.7 x10^3/uL


(0.0-1.1) 


 


 





 


Eosinophils # (Auto)


 0.1 x10^3/uL


(0.0-0.7) 


 


 





 


Basophils # (Auto)


 0.0 x10^3/uL


(0.0-0.2) 


 


 





 


Prothrombin Time


 13.0 SEC


(11.7-14.0) 


 


 





 


Prothromb Time International


Ratio 1.0 (0.8-1.1) 


 


 


 





 


Activated Partial


Thromboplast Time 28 SEC (24-38) 


 


 


 





 


Sodium Level


 133 mmol/L


(136-145) 


 


 





 


Potassium Level


 3.6 mmol/L


(3.5-5.1) 


 


 





 


Chloride Level


 96 mmol/L


() 


 


 





 


Carbon Dioxide Level


 28 mmol/L


(21-32) 


 


 





 


Anion Gap


 9 (6-14) 


 


 16 mmol/L


(6-14) 





 


Blood Urea Nitrogen 9 mg/dL (8-26)    


 


Creatinine


 0.7 mg/dL


(0.7-1.3) 


 


 





 


Estimated GFR


(Cockcroft-Gault) 113.9 


 


 


 





 


BUN/Creatinine Ratio 13 (6-20)    


 


Glucose Level


 158 mg/dL


(70-99) 


 156 mg/dL


(70-99) 





 


Calcium Level


 8.3 mg/dL


(8.5-10.1) 


 


 





 


Magnesium Level


 1.9 mg/dL


(1.8-2.4) 


 


 





 


Total Bilirubin


 0.7 mg/dL


(0.2-1.0) 


 


 





 


Aspartate Amino Transf


(AST/SGOT) 28 U/L (15-37) 


 


 


 





 


Alanine Aminotransferase


(ALT/SGPT) 29 U/L (16-63) 


 


 


 





 


Alkaline Phosphatase


 148 U/L


() 


 


 





 


Troponin I Quantitative


 < 0.017 ng/mL


(0.000-0.055) 


 


 





 


NT-Pro-B-Type Natriuretic


Peptide 141 pg/mL


(0-124) 


 


 





 


Total Protein


 7.8 g/dL


(6.4-8.2) 


 


 





 


Albumin


 3.6 g/dL


(3.4-5.0) 


 


 





 


Albumin/Globulin Ratio 0.9 (1.0-1.7)    


 


Triglycerides Level


 63 mg/dL


(0-150) 


 


 





 


Cholesterol Level


 114 mg/dL


(0-200) 


 


 





 


LDL Cholesterol, Calculated


 67 mg/dL


(0-100) 


 


 





 


VLDL Cholesterol, Calculated


 13 mg/dL


(0-40) 


 


 





 


Non-HDL Cholesterol Calculated


 80 mg/dL


(0-129) 


 


 





 


HDL Cholesterol


 34 mg/dL


(40-60) 


 


 





 


Cholesterol/HDL Ratio 3.4    


 


Thyroid Stimulating Hormone


(TSH) 7.190 uIU/mL


(0.358-3.74) 


 


 





 


Glucose (Fingerstick)


 


 143 mg/dL


(70-99) 


 





 


Bedside Hemoglobin


 


 


 14.3 g/dL


(14-18) 





 


Bedside Hematocrit   42 % (37-52)  


 


Bedside Sodium


 


 


 134 mmol/L


(135-145) 





 


Bedside Potassium


 


 


 3.5 mmol/L


(3.5-5.0) 





 


Bedside Chloride


 


 


 94 mmol/L


() 





 


Bedside Total CO2


 


 


 28 mmol/L


(23-32) 





 


Bedside Blood Urea Nitrogen   9 mg/dL (8-26)  


 


Bedside Creatinine


 


 


 0.6 mg/dL


(0.5-1.4) 





 


Bedside Ionized Calcium (Fidel)


 


 


 1.04 mmol/L


(1.13-1.32) 





 


Urine Collection Type    Unknown 


 


Urine Color    Yellow 


 


Urine Clarity    Clear 


 


Urine pH    7.5 


 


Urine Specific Gravity    1.025 


 


Urine Protein


 


 


 


 30 mg/dL


(NEG-TRACE)


 


Urine Glucose (UA)


 


 


 


 Negative mg/dL


(NEG)


 


Urine Ketones (Stick)


 


 


 


 Negative mg/dL


(NEG)


 


Urine Blood    Negative (NEG) 


 


Urine Nitrite    Negative (NEG) 


 


Urine Bilirubin    Negative (NEG) 


 


Urine Urobilinogen Dipstick


 


 


 


 0.2 mg/dL (0.2


mg/dL)


 


Urine Leukocyte Esterase    Negative (NEG) 


 


Urine RBC


 


 


 


 Rare /HPF


(0-2)


 


Urine WBC


 


 


 


 Rare /HPF


(0-4)


 


Urine Squamous Epithelial


Cells 


 


 


 None /LPF 





 


Urine Bacteria    0 /HPF (0-FEW) 


 


Urine Hyaline Casts


 


 


 


 Occasional


/HPF








Laboratory Tests








Test


 12/2/19


05:15 12/2/19


05:21 12/2/19


05:27 12/2/19


06:27


 


White Blood Count


 5.5 x10^3/uL


(4.0-11.0) 


 


 





 


Red Blood Count


 4.79 x10^6/uL


(4.30-5.70) 


 


 





 


Hemoglobin


 13.8 g/dL


(13.0-17.5) 


 


 





 


Hematocrit


 40.6 %


(39.0-53.0) 


 


 





 


Mean Corpuscular Volume 85 fL ()    


 


Mean Corpuscular Hemoglobin 29 pg (25-35)    


 


Mean Corpuscular Hemoglobin


Concent 34 g/dL


(31-37) 


 


 





 


Red Cell Distribution Width


 13.4 %


(11.5-14.5) 


 


 





 


Platelet Count


 177 x10^3/uL


(140-400) 


 


 





 


Neutrophils (%) (Auto) 61 % (31-73)    


 


Lymphocytes (%) (Auto) 25 % (24-48)    


 


Monocytes (%) (Auto) 12 % (0-9)    


 


Eosinophils (%) (Auto) 2 % (0-3)    


 


Basophils (%) (Auto) 1 % (0-3)    


 


Neutrophils # (Auto)


 3.3 x10^3/uL


(1.8-7.7) 


 


 





 


Lymphocytes # (Auto)


 1.4 x10^3/uL


(1.0-4.8) 


 


 





 


Monocytes # (Auto)


 0.7 x10^3/uL


(0.0-1.1) 


 


 





 


Eosinophils # (Auto)


 0.1 x10^3/uL


(0.0-0.7) 


 


 





 


Basophils # (Auto)


 0.0 x10^3/uL


(0.0-0.2) 


 


 





 


Prothrombin Time


 13.0 SEC


(11.7-14.0) 


 


 





 


Prothromb Time International


Ratio 1.0 (0.8-1.1) 


 


 


 





 


Activated Partial


Thromboplast Time 28 SEC (24-38) 


 


 


 





 


Sodium Level


 133 mmol/L


(136-145) 


 


 





 


Potassium Level


 3.6 mmol/L


(3.5-5.1) 


 


 





 


Chloride Level


 96 mmol/L


() 


 


 





 


Carbon Dioxide Level


 28 mmol/L


(21-32) 


 


 





 


Anion Gap


 9 (6-14) 


 


 16 mmol/L


(6-14) 





 


Blood Urea Nitrogen 9 mg/dL (8-26)    


 


Creatinine


 0.7 mg/dL


(0.7-1.3) 


 


 





 


Estimated GFR


(Cockcroft-Gault) 113.9 


 


 


 





 


BUN/Creatinine Ratio 13 (6-20)    


 


Glucose Level


 158 mg/dL


(70-99) 


 156 mg/dL


(70-99) 





 


Calcium Level


 8.3 mg/dL


(8.5-10.1) 


 


 





 


Magnesium Level


 1.9 mg/dL


(1.8-2.4) 


 


 





 


Total Bilirubin


 0.7 mg/dL


(0.2-1.0) 


 


 





 


Aspartate Amino Transf


(AST/SGOT) 28 U/L (15-37) 


 


 


 





 


Alanine Aminotransferase


(ALT/SGPT) 29 U/L (16-63) 


 


 


 





 


Alkaline Phosphatase


 148 U/L


() 


 


 





 


Troponin I Quantitative


 < 0.017 ng/mL


(0.000-0.055) 


 


 





 


NT-Pro-B-Type Natriuretic


Peptide 141 pg/mL


(0-124) 


 


 





 


Total Protein


 7.8 g/dL


(6.4-8.2) 


 


 





 


Albumin


 3.6 g/dL


(3.4-5.0) 


 


 





 


Albumin/Globulin Ratio 0.9 (1.0-1.7)    


 


Triglycerides Level


 63 mg/dL


(0-150) 


 


 





 


Cholesterol Level


 114 mg/dL


(0-200) 


 


 





 


LDL Cholesterol, Calculated


 67 mg/dL


(0-100) 


 


 





 


VLDL Cholesterol, Calculated


 13 mg/dL


(0-40) 


 


 





 


Non-HDL Cholesterol Calculated


 80 mg/dL


(0-129) 


 


 





 


HDL Cholesterol


 34 mg/dL


(40-60) 


 


 





 


Cholesterol/HDL Ratio 3.4    


 


Thyroid Stimulating Hormone


(TSH) 7.190 uIU/mL


(0.358-3.74) 


 


 





 


Glucose (Fingerstick)


 


 143 mg/dL


(70-99) 


 





 


Bedside Hemoglobin


 


 


 14.3 g/dL


(14-18) 





 


Bedside Hematocrit   42 % (37-52)  


 


Bedside Sodium


 


 


 134 mmol/L


(135-145) 





 


Bedside Potassium


 


 


 3.5 mmol/L


(3.5-5.0) 





 


Bedside Chloride


 


 


 94 mmol/L


() 





 


Bedside Total CO2


 


 


 28 mmol/L


(23-32) 





 


Bedside Blood Urea Nitrogen   9 mg/dL (8-26)  


 


Bedside Creatinine


 


 


 0.6 mg/dL


(0.5-1.4) 





 


Bedside Ionized Calcium (Fidel)


 


 


 1.04 mmol/L


(1.13-1.32) 





 


Urine Collection Type    Unknown 


 


Urine Color    Yellow 


 


Urine Clarity    Clear 


 


Urine pH    7.5 


 


Urine Specific Gravity    1.025 


 


Urine Protein


 


 


 


 30 mg/dL


(NEG-TRACE)


 


Urine Glucose (UA)


 


 


 


 Negative mg/dL


(NEG)


 


Urine Ketones (Stick)


 


 


 


 Negative mg/dL


(NEG)


 


Urine Blood    Negative (NEG) 


 


Urine Nitrite    Negative (NEG) 


 


Urine Bilirubin    Negative (NEG) 


 


Urine Urobilinogen Dipstick


 


 


 


 0.2 mg/dL (0.2


mg/dL)


 


Urine Leukocyte Esterase    Negative (NEG) 


 


Urine RBC


 


 


 


 Rare /HPF


(0-2)


 


Urine WBC


 


 


 


 Rare /HPF


(0-4)


 


Urine Squamous Epithelial


Cells 


 


 


 None /LPF 





 


Urine Bacteria    0 /HPF (0-FEW) 


 


Urine Hyaline Casts


 


 


 


 Occasional


/HPF











VTE Prophylaxis Ordered


VTE Prophylaxis Devices:  Contraindicated


VTE Pharmacological Prophylaxi:  Yes





Assessment/Plan


Assessment/Plan


accelerated hypertension


symptomatic hypertension with dizzyness, weakness


obesity, BMI 34


achalasia,  upper, has followed at [facility], has dysphagia, ongoing


htn,   eval with echo


hypothyroid,   non compliant











PARTH NICOLAS MD                  Dec 2, 2019 11:52

## 2019-12-02 NOTE — PHYS DOC
Past Medical History


Past Medical History:  Hypertension, Hypothyroid


 (JB MARTINEZ MD)


Past Surgical History:  Other


Additional Past Surgical Histo:  AORTIC ANEURSYM REPAIR,BACK,HAND


 (JB MARTINEZ MD)


Alcohol Use:  None


Drug Use:  None


 (JB MARTINEZ MD)





NIHSS Stroke Scale


NIH Stroke Scale:  








NIH Stroke Scale Response (Comments) Value


 


Level of Consciousness:                 0 Alert/Responsive 0


 


LOC Questions:                          0 Answers both correctly 0


 


LOC Commands:                           0 Performs both tasks 0


 


Best Gaze:                              0 Normal 0


 


Visual:                                 0 No visual loss 0


 


Facial Palsy:                           0 Normal, symmetrical 0


 


Motor - Left Arm                        0 No drift 0


 


Motor - Right Arm                       0 No drift 0


 


Motor - Left Leg                        0 No drift 0


 


Motor: Right Leg                        0 No drift 0


 


Limb Ataxia:                            0 Absent 0


 


Sensory:                                0 No loss 0


 


Best Language:                          0 Normal 0


 


Dysathria:                              0 Normal 0


 


Extinction and Inattention:             0 Normal 0


 


Total  0











Adult General


Chief Complaint


Chief Complaint:  NAUSEA/VOMITING/DIARRHA





HPI


HPI





]64-year-old male presents to the emergency department via EMS. Patient states 

he woke at 3 AM went to the bathroom and started folding some clothes when 

developed onset of dizziness, lightheadedness, nausea. This was between 4 am and

430 as wife states he called her at 430.  Patient denies chest pain, shortness 

of breath. His blood pressure at that time was in the 200s. Upon arrival blood 

pressure was elevated at 190. Patient was provided with hydralazine 10 mg as 

well as some Zofran. Initial evaluation with NIH scale was 0 however given 

concern for sudden onset of dizziness as well as nausea, code stroke was called.

 I-STAT creatinine obtained and 0.6, his glucose was 156. Patient was 

subsequently taken to CT for CT head as well as CTA head and neck.  


 (JB MARTINEZ MD)





Review of Systems


Review of Systems





Constitutional: Denies fever or chills []


Eyes: Denies change in visual acuity, redness, or eye pain []


HENT: Denies nasal congestion or sore throat []


Respiratory: Denies cough or shortness of breath []


Cardiovascular: No additional information not addressed in HPI []


GI: Denies abdominal pain, + nausea, no vomiting, bloody stools or diarrhea []


Musculoskeletal: Denies back pain or joint pain []


Integument: Denies rash or skin lesions []


Neurologic: Denies headache, focal weakness , NIHSS 0 []








All other systems were reviewed and found to be within normal limits, except as 

documented in this note.


 (JB MARTINEZ MD)





Current Medications


Current Medications





Current Medications








 Medications


  (Trade)  Dose


 Ordered  Sig/Stanton  Start Time


 Stop Time Status Last Admin


Dose Admin


 


 Aspirin


  (Children'S


 Aspirin)  324 mg  1X  ONCE  19 05:30


 19 05:31 DC  





 


 Hydralazine HCl


  (Apresoline Inj)  10 mg  1X  ONCE  19 05:45


 19 05:47 DC 19 05:30


10 MG


 


 Metoclopramide HCl


  (Reglan Vial)  10 mg  1X  ONCE  19 06:15


 19 06:16 DC 19 06:06


10 MG


 


 Morphine Sulfate


  (Morphine


 Sulfate)  2 mg  PRN Q15MIN  PRN  19 05:30


 12/3/19 05:29   





 


 Nitroglycerin


  (Nitrostat)  0.4 mg  PRN Q5MIN  PRN  19 05:15


 12/3/19 05:14   





 


 Ondansetron HCl


  (Zofran)  4 mg  PRN Q8HRS  PRN  19 06:15


 12/3/19 06:14   





 


 Sodium Chloride  1,000 ml @ 


 1,000 mls/hr  Q1H  19 05:09


 19 06:08 DC 19 05:20


1,000 MLS/HR





 (ARIAN PARKS MD)





Allergies


Allergies





Allergies








Coded Allergies Type Severity Reaction Last Updated Verified


 


  No Known Drug Allergies    16 No





 (ARIAN PARKS MD)





Physical Exam


Physical Exam





Constitutional: Well developed, well nourished, mild distress 2/2 nausea. []


HENT: Normocephalic, atraumatic, bilateral external ears normal, oropharynx 

moist, no oral exudates, nose normal. []


Eyes: PERRLA, EOMI, conjunctiva normal, no discharge.  No nystagmus appreciated 

[] 


Cardiovascular:Heart rate regular rhythm, no murmur []


Lungs & Thorax:  Bilateral breath sounds clear to auscultation []


Abdomen: Bowel sounds normal, soft, no tenderness, no masses, no pulsatile 

masses. [] 


Skin: Warm, dry, no erythema, no rash. [] 


Back: No tenderness, no CVA tenderness. [] 


Extremities: No tenderness, no edema. [] 


Neurologic: Alert and oriented X 3, no focal deficits noted. []


Psychologic: Affect normal, judgement normal, mood normal. []


 (JB MARTINEZ MD)





Current Patient Data


Vital Signs





                                   Vital Signs








  Date Time  Temp Pulse Resp B/P (MAP) Pulse Ox O2 Delivery O2 Flow Rate FiO2


 


19 06:20  76 16 147/76 (99) 95 Room Air  


 


19 05:25 97.4       





 97.4       





 (ARIAN PARKS MD)


Lab Values





                                Laboratory Tests








Test


 19


05:15 19


05:21 19


05:27 19


06:27


 


White Blood Count


 5.5 x10^3/uL


(4.0-11.0) 


 


 





 


Red Blood Count


 4.79 x10^6/uL


(4.30-5.70) 


 


 





 


Hemoglobin


 13.8 g/dL


(13.0-17.5) 


 


 





 


Hematocrit


 40.6 %


(39.0-53.0) 


 


 





 


Mean Corpuscular Volume


 85 fL ()


 


 


 





 


Mean Corpuscular Hemoglobin 29 pg (25-35)     


 


Mean Corpuscular Hemoglobin


Concent 34 g/dL


(31-37) 


 


 





 


Red Cell Distribution Width


 13.4 %


(11.5-14.5) 


 


 





 


Platelet Count


 177 x10^3/uL


(140-400) 


 


 





 


Neutrophils (%) (Auto) 61 % (31-73)     


 


Lymphocytes (%) (Auto) 25 % (24-48)     


 


Monocytes (%) (Auto) 12 % (0-9)  H   


 


Eosinophils (%) (Auto) 2 % (0-3)     


 


Basophils (%) (Auto) 1 % (0-3)     


 


Neutrophils # (Auto)


 3.3 x10^3/uL


(1.8-7.7) 


 


 





 


Lymphocytes # (Auto)


 1.4 x10^3/uL


(1.0-4.8) 


 


 





 


Monocytes # (Auto)


 0.7 x10^3/uL


(0.0-1.1) 


 


 





 


Eosinophils # (Auto)


 0.1 x10^3/uL


(0.0-0.7) 


 


 





 


Basophils # (Auto)


 0.0 x10^3/uL


(0.0-0.2) 


 


 





 


Prothrombin Time


 13.0 SEC


(11.7-14.0) 


 


 





 


Prothrombin Time INR 1.0 (0.8-1.1)     


 


Activated Partial


Thromboplast Time 28 SEC (24-38)


 


 


 





 


Sodium Level


 133 mmol/L


(136-145)  L 


 


 





 


Potassium Level


 3.6 mmol/L


(3.5-5.1) 


 


 





 


Chloride Level


 96 mmol/L


()  L 


 


 





 


Carbon Dioxide Level


 28 mmol/L


(21-32) 


 


 





 


Anion Gap


 9 (6-14)  


 


 16 mmol/L


(6-14)  H 





 


Blood Urea Nitrogen


 9 mg/dL (8-26)


 


 


 





 


Creatinine


 0.7 mg/dL


(0.7-1.3) 


 


 





 


Estimated GFR


(Cockcroft-Gault) 113.9  


 


 


 





 


BUN/Creatinine Ratio 13 (6-20)     


 


Glucose Level


 158 mg/dL


(70-99)  H 


 156 mg/dL


(70-99)  H 





 


Calcium Level


 8.3 mg/dL


(8.5-10.1)  L 


 


 





 


Magnesium Level


 4.8 mg/dL


(1.8-2.4)  H 


 


 





 


Total Bilirubin


 0.7 mg/dL


(0.2-1.0) 


 


 





 


Aspartate Amino Transferase


(AST) 28 U/L (15-37)


 


 


 





 


Alanine Aminotransferase (ALT)


 29 U/L (16-63)


 


 


 





 


Alkaline Phosphatase


 148 U/L


()  H 


 


 





 


Troponin I Quantitative


 < 0.017 ng/mL


(0.000-0.055) 


 


 





 


NT-Pro-B-Type Natriuretic


Peptide 141 pg/mL


(0-124)  H 


 


 





 


Total Protein


 7.8 g/dL


(6.4-8.2) 


 


 





 


Albumin


 3.6 g/dL


(3.4-5.0) 


 


 





 


Albumin/Globulin Ratio


 0.9 (1.0-1.7)


L 


 


 





 


Glucose (Fingerstick)


 


 143 mg/dL


(70-99)  H 


 





 


POC Hemoglobin


 


 


 14.3 g/dL


(14-18) 





 


POC Hematocrit   42 % (37-52)   


 


POC Sodium


 


 


 134 mmol/L


(135-145)  L 





 


POC Potassium


 


 


 3.5 mmol/L


(3.5-5.0) 





 


POC Chloride


 


 


 94 mmol/L


()  L 





 


POC Total CO2


 


 


 28 mmol/L


(23-32) 





 


POC Blood Urea Nitrogen


 


 


 9 mg/dL (8-26)


 





 


POC Creatinine


 


 


 0.6 mg/dL


(0.5-1.4) 





 


POC Ionized Calcium (Fidel)


 


 


 1.04 mmol/L


(1.13-1.32)  L 





 


Urine Collection Type    Unknown  


 


Urine Color    Yellow  


 


Urine Clarity    Clear  


 


Urine pH    7.5  


 


Urine Specific Gravity    1.025  


 


Urine Protein


 


 


 


 30 mg/dL


(NEG-TRACE)


 


Urine Glucose (UA)


 


 


 


 Negative mg/dL


(NEG)


 


Urine Ketones (Stick)


 


 


 


 Negative mg/dL


(NEG)


 


Urine Blood


 


 


 


 Negative (NEG)





 


Urine Nitrite


 


 


 


 Negative (NEG)





 


Urine Bilirubin


 


 


 


 Negative (NEG)





 


Urine Urobilinogen Dipstick


 


 


 


 0.2 mg/dL (0.2


mg/dL)


 


Urine Leukocyte Esterase


 


 


 


 Negative (NEG)





 


Urine RBC


 


 


 


 Rare /HPF


(0-2)


 


Urine WBC


 


 


 


 Rare /HPF


(0-4)


 


Urine Squamous Epithelial


Cells 


 


 


 None /LPF  





 


Urine Bacteria


 


 


 


 0 /HPF (0-FEW)





 


Urine Hyaline Casts


 


 


 


 Occasional


/HPF





                                Laboratory Tests


19 05:15








                                Laboratory Tests


19 05:15








19 05:27








 (ARIAN PARKS MD)





EKG


EKG


[]


 (JB MARTINEZ MD)





Radiology/Procedures


Radiology/Procedures


[]


 (JB MARTINEZ MD)


Radiology/Procedures


[facility] acute hospital


                    [address] [place] [address]


                                 [phone]


                                        


                                 IMAGING REPORT





                                     Signed





PATIENT: BONIFACIO CLARK  ACCOUNT: [id number]     MRN#: [id number]


: 1955           LOCATION: ER              AGE: 64


SEX: M                    EXAM DT: 19         ACCESSION#: [id number]


STATUS: REG ER            ORD. PHYSICIAN: JB MARTINEZ MD


REASON: Dizzyness, omni 350, 100 ml iv


PROCEDURE: CTA HEAD/NECK - CODE STROKE





CLINICAL HISTORY: Dizziness 


 


COMPARISON: None available.


 


TECHNIQUE: CT angiogram of the head and neck was performed following the 


administration of IV contrast. Multiplanar reconstructed images were 


obtained including 3D reconstructed images performed on an independent 


work station. Stenosis if present in the carotid arteries were measured 


using NASCET criteria. 


 


PQRS compliance statement - One or more of the following individualized 


dose reduction techniques were utilized for this study:


1.  Automated exposure control


2.  Adjustment of the mA and/or kV according to patient size


3.  Use of iterative reconstruction technique


 


FINDINGS: 


 


CTA of the intracranial circulation reveals normal appearing distal 


internal carotid arteries including the distal cervical, petrous, 


cavernous and supraclinoid portions.


 


The anterior cerebral arteries are  well visualized and without evidence 


of stenosis or occlusion.


 


The middle cerebral  arteries are well visualized and without evidence of 


stenosis or occlusion.


 


The posterior cerebral arteries are well visualized and without evidence 


of stenosis or occlusion.


 


The vertebral basilar system  is normal with no evidence of stenosis or 


occlusion.


 


In the neck, the origins of the great vessels are unremarkable. 


 


The common carotid arteries, bilaterally are normal with no evidence of 


significant stenosis or occlusion.


 


The internal carotid arteries are normal bilaterally with no evidence of 


stenosis.


 


The right vertebral artery is diminutive, particularly proximally, 


possibly physiologic for this patient or postsurgical. Occlusion would 


also have this appearance.. The left vertebral artery is dominant.


 


A few mildly prominent AP window lymph nodes are seen.


 


IMPRESSION:


1.  Diminutive appearance of the proximal right vertebral artery is 


nonspecific. This may be physiologic for this patient or postsurgical 


given the surgical clips at the base of the right neck. Alternatively, 


occlusion with thrombus of the proximal right vertebral artery would also 


have this appearance.


2.  Otherwise the left vertebral artery and carotid arteries in the neck 


are widely patent.


3.  No evidence for high-grade stenosis or occlusion of the intracranial 


circulation.


 


Electronically signed by: Cj Viramontes MD (2019 6:19 AM) [id number]














DICTATED and SIGNED BY:     CJ VIRAMONTES MD


DATE:     19





 (ARIAN PARKS MD)





Course & Med Decision Making


Course & Med Decision Making


Pertinent Labs and Imaging studies reviewed. (See chart for details)





[]64-year-old male presents to the emergency department via EMS. Patient states 

he woke at 3 AM went to the bathroom and started folding some clothes when 

developed onset of dizziness, lightheadedness, nausea. This was between 4 am and

 430 as wife states he called her at 430.  Patient denies chest pain, shortness 

of breath. His blood pressure at that time was in the 200s. Upon arrival blood p

ressure was elevated at 190. Patient was provided with hydralazine 10 mg as well

 as some Zofran. Initial evaluation with NIH scale was 0 however given concern 

for sudden onset of dizziness as well as nausea, code stroke was called.  I-STAT

 creatinine obtained and 0.6, his glucose was 156. Patient was subsequently 

taken to CT for CT head as well as CTA head and neck.  





NIHSS 0


Despite NIHSS as above, will proceed with CT head and CTA head/neck to rule out 

cerebellar CVA


Patient is on no blood thinning medications at this time (levothyroxine, 

lisinopril)





Noncontrast CT head negative for acute intracranial process 0556





Discussed with DR. PARKS regarding patients presentation, care transferred to 

her at 0600.  She will follow up CTA head/neck.


Discussed with patient's family at bedside regarding plan of care


 (JB MARTINEZ MD)


Course & Med Decision Making


Patient care transferred to me at 0600 for following up the results of CT angio 

of head and neck. Patient felt better with nausea and dizziness. Patient denied 

any focal neuro deficit. Blood pressure was 147/56. CT angio of head and neck  

was reported with right vertebral artery occlusion that could be related to 

previous surgery  or acute occlusion. Patient had history of congenital 

abnormality with repair in his adult age as aortic aneurysm. Dr. Landry 

consulted at 0631 and recommended to consult  stroke team at  [facility]. Dr. Abel  stroke neuro specialist  was consulted at 0638 and after reviewing the

 CT angiogram did not recommend any more treatment and  stated the narrowing of 

right vertebral artery was not acute finding. He agreed to give the patient 

aspirin and admitted to the hospital for vertigo. Dr. Landry was informed 

about plan of care at 0740.


 (ARIAN PARKS MD)


Dragon Disclaimer


Dragon Disclaimer


This electronic medical record was generated, in whole or in part, using a voice

 recognition dictation system.


 (JB MARTINEZ MD)





Departure


Departure


Impression:  


   Primary Impression:  


   Dizziness


   Additional Impression:  


   HTN (hypertension)


Disposition:  09 ADMITTED AS INPATIENT


Referrals:  


NO PCP (PCP)





Problem Qualifiers








   Additional Impression:  


   HTN (hypertension)


   Hypertension type:  unspecified  Qualified Codes:  I10 - Essential (primary) 

   hypertension








JB MARTINEZ MD               Dec 2, 2019 05:39


ARIAN PARKS MD              Dec 2, 2019 07:46

## 2019-12-02 NOTE — PDOC3
Discharge Summary


Visit Information


Date of Admission:  Dec 2, 2019


Date of Discharge:  Dec 2, 2019


Final Diagnosis


accelerated hypertension


symptomatic hypertension with dizzyness, weakness


obesity, BMI 34


achalasia,  upper, has followed at , has dysphagia, ongoing


htn,   eval with echo


hypothyroid,   non compliant








Problems


Medical Problems:


(1) Dizziness


Status: Acute  





(2) HTN (hypertension)


Status: Acute  








Brief Hospital Course


Allergies





                                    Allergies








Coded Allergies Type Severity Reaction Last Updated Verified


 


  No Known Drug Allergies    4/28/16 No








Vital Signs





Vital Signs








  Date Time  Temp Pulse Resp B/P (MAP) Pulse Ox O2 Delivery O2 Flow Rate FiO2


 


12/2/19 08:00      Room Air  


 


12/2/19 07:00 97.2 70 18 151/80 (103) 97   





 97.2       








Lab Results





Laboratory Tests








Test


 12/2/19


05:15 12/2/19


05:21 12/2/19


05:27 12/2/19


06:27


 


White Blood Count


 5.5 x10^3/uL


(4.0-11.0) 


 


 





 


Red Blood Count


 4.79 x10^6/uL


(4.30-5.70) 


 


 





 


Hemoglobin


 13.8 g/dL


(13.0-17.5) 


 


 





 


Hematocrit


 40.6 %


(39.0-53.0) 


 


 





 


Mean Corpuscular Volume 85 fL ()    


 


Mean Corpuscular Hemoglobin 29 pg (25-35)    


 


Mean Corpuscular Hemoglobin


Concent 34 g/dL


(31-37) 


 


 





 


Red Cell Distribution Width


 13.4 %


(11.5-14.5) 


 


 





 


Platelet Count


 177 x10^3/uL


(140-400) 


 


 





 


Neutrophils (%) (Auto) 61 % (31-73)    


 


Lymphocytes (%) (Auto) 25 % (24-48)    


 


Monocytes (%) (Auto) 12 % (0-9)    


 


Eosinophils (%) (Auto) 2 % (0-3)    


 


Basophils (%) (Auto) 1 % (0-3)    


 


Neutrophils # (Auto)


 3.3 x10^3/uL


(1.8-7.7) 


 


 





 


Lymphocytes # (Auto)


 1.4 x10^3/uL


(1.0-4.8) 


 


 





 


Monocytes # (Auto)


 0.7 x10^3/uL


(0.0-1.1) 


 


 





 


Eosinophils # (Auto)


 0.1 x10^3/uL


(0.0-0.7) 


 


 





 


Basophils # (Auto)


 0.0 x10^3/uL


(0.0-0.2) 


 


 





 


Prothrombin Time


 13.0 SEC


(11.7-14.0) 


 


 





 


Prothromb Time International


Ratio 1.0 (0.8-1.1) 


 


 


 





 


Activated Partial


Thromboplast Time 28 SEC (24-38) 


 


 


 





 


Sodium Level


 133 mmol/L


(136-145) 


 


 





 


Potassium Level


 3.6 mmol/L


(3.5-5.1) 


 


 





 


Chloride Level


 96 mmol/L


() 


 


 





 


Carbon Dioxide Level


 28 mmol/L


(21-32) 


 


 





 


Anion Gap


 9 (6-14) 


 


 16 mmol/L


(6-14) 





 


Blood Urea Nitrogen 9 mg/dL (8-26)    


 


Creatinine


 0.7 mg/dL


(0.7-1.3) 


 


 





 


Estimated GFR


(Cockcroft-Gault) 113.9 


 


 


 





 


BUN/Creatinine Ratio 13 (6-20)    


 


Glucose Level


 158 mg/dL


(70-99) 


 156 mg/dL


(70-99) 





 


Calcium Level


 8.3 mg/dL


(8.5-10.1) 


 


 





 


Magnesium Level


 1.9 mg/dL


(1.8-2.4) 


 


 





 


Total Bilirubin


 0.7 mg/dL


(0.2-1.0) 


 


 





 


Aspartate Amino Transf


(AST/SGOT) 28 U/L (15-37) 


 


 


 





 


Alanine Aminotransferase


(ALT/SGPT) 29 U/L (16-63) 


 


 


 





 


Alkaline Phosphatase


 148 U/L


() 


 


 





 


Troponin I Quantitative


 < 0.017 ng/mL


(0.000-0.055) 


 


 





 


NT-Pro-B-Type Natriuretic


Peptide 141 pg/mL


(0-124) 


 


 





 


Total Protein


 7.8 g/dL


(6.4-8.2) 


 


 





 


Albumin


 3.6 g/dL


(3.4-5.0) 


 


 





 


Albumin/Globulin Ratio 0.9 (1.0-1.7)    


 


Triglycerides Level


 63 mg/dL


(0-150) 


 


 





 


Cholesterol Level


 114 mg/dL


(0-200) 


 


 





 


LDL Cholesterol, Calculated


 67 mg/dL


(0-100) 


 


 





 


VLDL Cholesterol, Calculated


 13 mg/dL


(0-40) 


 


 





 


Non-HDL Cholesterol Calculated


 80 mg/dL


(0-129) 


 


 





 


HDL Cholesterol


 34 mg/dL


(40-60) 


 


 





 


Cholesterol/HDL Ratio 3.4    


 


Thyroid Stimulating Hormone


(TSH) 7.190 uIU/mL


(0.358-3.74) 


 


 





 


Glucose (Fingerstick)


 


 143 mg/dL


(70-99) 


 





 


Bedside Hemoglobin


 


 


 14.3 g/dL


(14-18) 





 


Bedside Hematocrit   42 % (37-52)  


 


Bedside Sodium


 


 


 134 mmol/L


(135-145) 





 


Bedside Potassium


 


 


 3.5 mmol/L


(3.5-5.0) 





 


Bedside Chloride


 


 


 94 mmol/L


() 





 


Bedside Total CO2


 


 


 28 mmol/L


(23-32) 





 


Bedside Blood Urea Nitrogen   9 mg/dL (8-26)  


 


Bedside Creatinine


 


 


 0.6 mg/dL


(0.5-1.4) 





 


Bedside Ionized Calcium (Fidel)


 


 


 1.04 mmol/L


(1.13-1.32) 





 


Urine Collection Type    Unknown 


 


Urine Color    Yellow 


 


Urine Clarity    Clear 


 


Urine pH    7.5 


 


Urine Specific Gravity    1.025 


 


Urine Protein


 


 


 


 30 mg/dL


(NEG-TRACE)


 


Urine Glucose (UA)


 


 


 


 Negative mg/dL


(NEG)


 


Urine Ketones (Stick)


 


 


 


 Negative mg/dL


(NEG)


 


Urine Blood    Negative (NEG) 


 


Urine Nitrite    Negative (NEG) 


 


Urine Bilirubin    Negative (NEG) 


 


Urine Urobilinogen Dipstick


 


 


 


 0.2 mg/dL (0.2


mg/dL)


 


Urine Leukocyte Esterase    Negative (NEG) 


 


Urine RBC


 


 


 


 Rare /HPF


(0-2)


 


Urine WBC


 


 


 


 Rare /HPF


(0-4)


 


Urine Squamous Epithelial


Cells 


 


 


 None /LPF 





 


Urine Bacteria    0 /HPF (0-FEW) 


 


Urine Hyaline Casts


 


 


 


 Occasional


/HPF








Laboratory Tests








Test


 12/2/19


05:15 12/2/19


05:21 12/2/19


05:27 12/2/19


06:27


 


White Blood Count


 5.5 x10^3/uL


(4.0-11.0) 


 


 





 


Red Blood Count


 4.79 x10^6/uL


(4.30-5.70) 


 


 





 


Hemoglobin


 13.8 g/dL


(13.0-17.5) 


 


 





 


Hematocrit


 40.6 %


(39.0-53.0) 


 


 





 


Mean Corpuscular Volume 85 fL ()    


 


Mean Corpuscular Hemoglobin 29 pg (25-35)    


 


Mean Corpuscular Hemoglobin


Concent 34 g/dL


(31-37) 


 


 





 


Red Cell Distribution Width


 13.4 %


(11.5-14.5) 


 


 





 


Platelet Count


 177 x10^3/uL


(140-400) 


 


 





 


Neutrophils (%) (Auto) 61 % (31-73)    


 


Lymphocytes (%) (Auto) 25 % (24-48)    


 


Monocytes (%) (Auto) 12 % (0-9)    


 


Eosinophils (%) (Auto) 2 % (0-3)    


 


Basophils (%) (Auto) 1 % (0-3)    


 


Neutrophils # (Auto)


 3.3 x10^3/uL


(1.8-7.7) 


 


 





 


Lymphocytes # (Auto)


 1.4 x10^3/uL


(1.0-4.8) 


 


 





 


Monocytes # (Auto)


 0.7 x10^3/uL


(0.0-1.1) 


 


 





 


Eosinophils # (Auto)


 0.1 x10^3/uL


(0.0-0.7) 


 


 





 


Basophils # (Auto)


 0.0 x10^3/uL


(0.0-0.2) 


 


 





 


Prothrombin Time


 13.0 SEC


(11.7-14.0) 


 


 





 


Prothromb Time International


Ratio 1.0 (0.8-1.1) 


 


 


 





 


Activated Partial


Thromboplast Time 28 SEC (24-38) 


 


 


 





 


Sodium Level


 133 mmol/L


(136-145) 


 


 





 


Potassium Level


 3.6 mmol/L


(3.5-5.1) 


 


 





 


Chloride Level


 96 mmol/L


() 


 


 





 


Carbon Dioxide Level


 28 mmol/L


(21-32) 


 


 





 


Anion Gap


 9 (6-14) 


 


 16 mmol/L


(6-14) 





 


Blood Urea Nitrogen 9 mg/dL (8-26)    


 


Creatinine


 0.7 mg/dL


(0.7-1.3) 


 


 





 


Estimated GFR


(Cockcroft-Gault) 113.9 


 


 


 





 


BUN/Creatinine Ratio 13 (6-20)    


 


Glucose Level


 158 mg/dL


(70-99) 


 156 mg/dL


(70-99) 





 


Calcium Level


 8.3 mg/dL


(8.5-10.1) 


 


 





 


Magnesium Level


 1.9 mg/dL


(1.8-2.4) 


 


 





 


Total Bilirubin


 0.7 mg/dL


(0.2-1.0) 


 


 





 


Aspartate Amino Transf


(AST/SGOT) 28 U/L (15-37) 


 


 


 





 


Alanine Aminotransferase


(ALT/SGPT) 29 U/L (16-63) 


 


 


 





 


Alkaline Phosphatase


 148 U/L


() 


 


 





 


Troponin I Quantitative


 < 0.017 ng/mL


(0.000-0.055) 


 


 





 


NT-Pro-B-Type Natriuretic


Peptide 141 pg/mL


(0-124) 


 


 





 


Total Protein


 7.8 g/dL


(6.4-8.2) 


 


 





 


Albumin


 3.6 g/dL


(3.4-5.0) 


 


 





 


Albumin/Globulin Ratio 0.9 (1.0-1.7)    


 


Triglycerides Level


 63 mg/dL


(0-150) 


 


 





 


Cholesterol Level


 114 mg/dL


(0-200) 


 


 





 


LDL Cholesterol, Calculated


 67 mg/dL


(0-100) 


 


 





 


VLDL Cholesterol, Calculated


 13 mg/dL


(0-40) 


 


 





 


Non-HDL Cholesterol Calculated


 80 mg/dL


(0-129) 


 


 





 


HDL Cholesterol


 34 mg/dL


(40-60) 


 


 





 


Cholesterol/HDL Ratio 3.4    


 


Thyroid Stimulating Hormone


(TSH) 7.190 uIU/mL


(0.358-3.74) 


 


 





 


Glucose (Fingerstick)


 


 143 mg/dL


(70-99) 


 





 


Bedside Hemoglobin


 


 


 14.3 g/dL


(14-18) 





 


Bedside Hematocrit   42 % (37-52)  


 


Bedside Sodium


 


 


 134 mmol/L


(135-145) 





 


Bedside Potassium


 


 


 3.5 mmol/L


(3.5-5.0) 





 


Bedside Chloride


 


 


 94 mmol/L


() 





 


Bedside Total CO2


 


 


 28 mmol/L


(23-32) 





 


Bedside Blood Urea Nitrogen   9 mg/dL (8-26)  


 


Bedside Creatinine


 


 


 0.6 mg/dL


(0.5-1.4) 





 


Bedside Ionized Calcium (Fidel)


 


 


 1.04 mmol/L


(1.13-1.32) 





 


Urine Collection Type    Unknown 


 


Urine Color    Yellow 


 


Urine Clarity    Clear 


 


Urine pH    7.5 


 


Urine Specific Gravity    1.025 


 


Urine Protein


 


 


 


 30 mg/dL


(NEG-TRACE)


 


Urine Glucose (UA)


 


 


 


 Negative mg/dL


(NEG)


 


Urine Ketones (Stick)


 


 


 


 Negative mg/dL


(NEG)


 


Urine Blood    Negative (NEG) 


 


Urine Nitrite    Negative (NEG) 


 


Urine Bilirubin    Negative (NEG) 


 


Urine Urobilinogen Dipstick


 


 


 


 0.2 mg/dL (0.2


mg/dL)


 


Urine Leukocyte Esterase    Negative (NEG) 


 


Urine RBC


 


 


 


 Rare /HPF


(0-2)


 


Urine WBC


 


 


 


 Rare /HPF


(0-4)


 


Urine Squamous Epithelial


Cells 


 


 


 None /LPF 





 


Urine Bacteria    0 /HPF (0-FEW) 


 


Urine Hyaline Casts


 


 


 


 Occasional


/HPF








Brief Hospital Course


Mr. Hays  is a 64 old male,  had not been compliant with meds,   admit with 

dizzyness, weakness,   symptomatic accelerated htn


neuro and CV consult, 


echo done, res. pending


htn improved with home meds, DC home





Discharge Information


Condition at Discharge:  Improved


Follow Up:  Weeks


Disposition/Orders:  D/C to Home


Scheduled


Ciprofloxacin Hcl (Cipro) 500 Mg Tablet, 1 TAB PO BID, #14


   Prescribed by: BRENDON CHAMPION on 6/11/18 1131


Hydrochlorothiazide (Hydrochlorothiazide Tablet) 12.5 Mg Tablet, 1 TAB PO DAILY,

#30 Ref 11


   Prescribed by: PARTH NICOLAS on 4/29/16 1225


   Last Action: Converted on 12/2/19 1057 by FAIZAN HUMPHREY RN


Levothyroxine Sodium (Levothyroxine Sodium) 88 Mcg Tablet, 88 MCG PO DAILYAC for

THYROID SUPPLEMENT, #30 Ref 0 (Reported)


   Entered as Reported by: Abdelrahman Pro on 6/9/18 2026


   Last Action: Continued on 12/2/19 1057 by FAIZAN HUMPHREY RN


Lisinopril (Lisinopril) 40 Mg Tablet, 40 MG PO DAILY for FOR HYPERTENSION, #30 

Ref 11


   Prescribed by: PARTH NICOLAS on 4/29/16 1225


   Last Action: Continued on 12/2/19 1057 by FAIZAN HUMPHREY RN


Pantoprazole Sodium (Protonix) 20 Mg Tablet.dr, 40 MG PO BID, (Reported)


   Entered as Reported by: Abdelrahman Pro on 6/9/18 2026





Patient Instructions


Patient Instructions


> 30 min











PARTH NICOLAS MD                  Dec 2, 2019 11:54

## 2019-12-02 NOTE — NUR
Discharge Note:



[facility]



Discharge instructions and discharge home medications reviewed with Patient and spouse a 
copy given. All questions have been answered and understanding verbalized. 



The following instructions and handouts were given: hypertensin and dizziness. Teaching 
regarding medication complains emphasized including following up with primary care doctor. 
Flu Vac held due to patient health status.



Discontinued iv lines and catheters intact.



Patient discharged to home with self-care via private vehicle.

## 2019-12-02 NOTE — PDOC2
NEUROLOGY CONSULT


Date of Admission


Date of Admission


DATE: 12/2/19 


TIME: 14:42





Reason for Consult


Reason for Consult:


IMPRESSION:


Hypertensive emergency,  mmHg.


Metabolic encephalopathy. 


Dizziness.


Light headiness.


HTN.


Hypothyroidism.


Obesity.





RECOMMENDATIONS/PLAN:


BP control.


Treat medical diseases.


 mg daily. 


Lab: see orders.


Brain MRI w/o contrast if no contraindications.


OT/PT.





HISTORY OF PRESENT ILLNESS


This is a 64-year-old male patient with history of above medical diseases who 

presented to the ER of [facility] with complaints of secondary to dizziness and 

lightheadedness and nausea those started around 3:00 am at home. He called his 

wife and his SBP was 230 mmHg at home. Due to persistent symptoms, he was 

brought to the ER of [facility]. His HCT and CTA showed no acute findings, but reported

diminutive appearance of the proximal right vertebral artery is nonspecific. 

This may be physiologic for this patient or postsurgical  given the surgical 

clips at the base of the right neck. Alternatively, occlusion of the proximal 

right vertebral artery would also have this appearance. Patient stated on 

12/2/19 that he still has symptoms of dizziness but is improved, and no nausea 

or vomiting. No focalized motor or sensory deficits. Further evaluation ordered,

but his nurse notified he has insurance concerns and he is to be transferred to 

 where his insurance covers. 





PAST MEDICAL HISTORY


Cardiovascular:  HTN


Pulmonary:  No pertinent hx


CENTRAL NERVOUS SYSTEM:  Other (no pertinent hx)


GI:  GERD


Heme/Onc:  No pertinent hx


Hepatobiliary:  No pertinent hx


Psych:  No pertinent hx


Musculoskeletal:  Osteoarthritis, Other


Rheumatologic:  No pertinent hx


Infectious disease:  No pertinent hx


ENT:  No pertinent hx


Renal/:  No pertinent hx


Endocrine:  Hypothyroidism


Dermatology:  No pertinent hx





PAST SURGICAL HISTORY


Back surgery, aortic aneurysm repair, tonsillectomy, cataract extraction.





FAMILY HISTORY


Diabetes, Heart Disease





ALLERGY:


NKDA





MEDICATIONS:


Refer to MAR





FAMILY HISTORY: 


Non contributory.





SOCIAL HISTORY: 


Lives at home. 


Denies current smoking, drinking, and illicit drug use.  





REVIEW OF SYSTEMS:


Constitutional: Obese.


Head: No traumatic brain or head injury.


Skin: No edema, or rash.


Ear: No infection.


Eyes: No vision loss or color blindness.


Nose: No bleeding or purulent discharges.


Hearing: No hearing decrease.


Neck: No injury.


Cardiac: HTN.


Pulmonary: No COPD.


GI: No GI ulcer, GI bleeding, GERD.


Urinary/genital: UTI.


Endocrinologic: Hypothyroidism, obesity.


Skeletomuscular: No muscular atrophy, deformity.


Neurological: see  HP.


Psychiatric: Denies drug use/abuse.


Otherwise, not pertinant14-point review of systems.





PHYSICAL EXAMINATION:   


General appearance is in subacute distress.  


HEENT:  Normocephalic and nontraumatic.  Eyes, nose, ears, and throat are 

unremarkable.  


Neck is supple. No lymphadenopathy. No crepitus. 


Cardiovascular:  S1, S2, regular rate and rhythm.  


Pulmonary:  Clear to auscultation bilaterally. 


Abdomen:  Bowel sounds are positive.  Abdomen is soft, nontender, and 

nondistended.    


Extremities:  No rash, lesions, or edema.  


No restriction of range of motion





NEUROLOGICAL  EXAMINATION:


Alert 


Oriented to time, place and person.


PERRL.


EOMI.


CN: no focal findings.


Muscle tone: within normal.


Muscle strength: 5


DTR: 2-


Plantar reflex: Flexor response bilaterally 


Gait: not examined in bed. 


Sensory exam: no abnormal findings.


No cerebellar signs elicited.


F-T-N test accurate.





Current Medications


Current Medications





Current Medications


Aspirin (Children'S Aspirin) 324 mg 1X  ONCE PO ;  Start 12/2/19 at 05:30;  Stop

12/2/19 at 05:31;  Status DC


Nitroglycerin (Nitrostat) 0.4 mg PRN Q5MIN  PRN SL CP RATING > 1/10;  Start 

12/2/19 at 05:15;  Stop 12/3/19 at 05:14


Morphine Sulfate (Morphine Sulfate) 2 mg PRN Q15MIN  PRN IV/SQ PAIN GREATER THAN

3/10;  Start 12/2/19 at 05:30;  Stop 12/2/19 at 14:00;  Status DC


Sodium Chloride 1,000 ml @  1,000 mls/hr Q1H IV  Last administered on 12/2/19at 

05:20;  Start 12/2/19 at 05:09;  Stop 12/2/19 at 06:08;  Status DC


Ondansetron HCl (Zofran) 4 mg 1X  ONCE IV  Last administered on 12/2/19at 05:24;

 Start 12/2/19 at 05:30;  Stop 12/2/19 at 05:31;  Status DC


Hydralazine HCl (Apresoline Inj) 20 mg STK-MED ONCE .ROUTE ;  Start 12/2/19 at 

05:31;  Stop 12/2/19 at 05:31;  Status DC


Hydralazine HCl (Apresoline Inj) 10 mg 1X  ONCE IVP  Last administered on 

12/2/19at 05:30;  Start 12/2/19 at 05:45;  Stop 12/2/19 at 05:47;  Status DC


Metoclopramide HCl (Reglan Vial) 10 mg 1X  ONCE IVP  Last administered on 

12/2/19at 06:06;  Start 12/2/19 at 06:15;  Stop 12/2/19 at 06:16;  Status DC


Ondansetron HCl (Zofran) 4 mg PRN Q8HRS  PRN IV NAUSEA/VOMITING Last 

administered on 12/2/19at 12:29;  Start 12/2/19 at 06:15;  Stop 12/2/19 at 

14:26;  Status DC


Iohexol (Omnipaque 350 Mg/ml) 100 ml 1X  ONCE IV  Last administered on 12/2/19at

06:23;  Start 12/2/19 at 06:30;  Stop 12/2/19 at 06:31;  Status DC


Info (CONTRAST GIVEN -- Rx MONITORING) 1 each PRN DAILY  PRN MC SEE COMMENTS;  

Start 12/2/19 at 06:30;  Stop 12/4/19 at 06:29


Aspirin (Children'S Aspirin) 81 mg 1X  ONCE PO ;  Start 12/2/19 at 07:00;  Stop 

12/2/19 at 07:01;  Status DC


Influenza Virus Vaccine Quadrival (Afluria Quad 2019-20 (3yr Up) Syringe) 0.5 ml

ONCE ONCE VAX IM ;  Start 12/2/19 at 09:30;  Stop 12/2/19 at 09:31;  Status DC


Levothyroxine Sodium (Synthroid) 88 mcg DAILYAC PO ;  Start 12/2/19 at 12:00


Lisinopril (Prinivil) 40 mg DAILY PO  Last administered on 12/2/19at 12:29;  

Start 12/2/19 at 12:00


Hydrochlorothiazide (Microzide) 12.5 mg DAILY PO  Last administered on 12/2/19at

12:29;  Start 12/2/19 at 12:00


Aspirin (Stephie Aspirin) 325 mg DAILYWBKFT PO ;  Start 12/2/19 at 12:00


Acetaminophen (Tylenol) 650 mg 1X  ONCE PO  Last administered on 12/2/19at 

12:29;  Start 12/2/19 at 12:15;  Stop 12/2/19 at 12:16;  Status DC


Hydralazine HCl (Apresoline Inj) 10 mg PRN Q4HRS  PRN IVP ELEVATED BP, SEE 

COMMENTS;  Start 12/2/19 at 12:45


Sodium Chloride 1,000 ml @  100 mls/hr 1X  ONCE IV  Last administered on 

12/2/19at 14:00;  Start 12/2/19 at 14:00;  Stop 12/2/19 at 23:59


Ondansetron HCl (Zofran) 4 mg PRN Q4HRS  PRN IVP NAUSEA/VOMITING Last 

administered on 12/2/19at 14:29;  Start 12/2/19 at 14:30





Active Scripts


Active


Cipro (Ciprofloxacin Hcl) 500 Mg Tablet 1 Tab PO BID


Hydrochlorothiazide Tablet (Hydrochlorothiazide) 12.5 Mg Tablet 1 Tab PO DAILY


Lisinopril 40 Mg Tablet 40 Mg PO DAILY


Reported


Protonix (Pantoprazole Sodium) 20 Mg Tablet.dr 40 Mg PO BID


Levothyroxine Sodium 88 Mcg Tablet 88 Mcg PO DAILYAC





Allergies


Allergies:





Allergies








Coded Allergies Type Severity Reaction Last Updated Verified


 


  No Known Drug Allergies    4/28/16 No











ROS


Review of System


The patient denies any associated fevers, chills, headache, ear pain, 

rhinorrhea, sore throat, stiff neck, productive cough, chest pain, shortness of 

breath, back or flank pain, abdominal pain, nausea, vomiting, diarrhea, 

constipation, dysuria, rash, numbness, weakness, tingling, incontinence, diffic

ulty  ambulating, or diaphoresis.





Physical Exam


Physical Exam


General: Well developed, well nourished, no acute distress, well appearing


HEENT:  Pupils equally round and reactive to light, EOMI, no discharge, normal 

conjunctiva


Neck:  Supple, no nuchal rigidity, no JVD, trachea midline, no tenderness


Cardiac:  RRR, no murmurs, no gallops, no rubs


Chest/Lungs:  CTAB, no wheeze, no rhonchi, no crackles


Abdomen: soft, non-distended, no guarding, no peritoneal signs, non-tender 


Back:  No tenderness


Extremities:  no edema, pulses intact, non-tender,capillary refill <3 sec 

bilateral upper and lower extremities,


Neuro:  Alert and oriented x 4, no focal deficits, normal speech





Vitals


Vitals:





Vital Signs








  Date Time  Temp Pulse Resp B/P (MAP) Pulse Ox O2 Delivery O2 Flow Rate FiO2


 


12/2/19 12:29  71  180/87    


 


12/2/19 11:00 97.3  14  98 Room Air  





 97.3       











Labs


Labs





Laboratory Tests








Test


 12/2/19


05:15 12/2/19


05:21 12/2/19


05:27 12/2/19


06:27


 


White Blood Count


 5.5 x10^3/uL


(4.0-11.0) 


 


 





 


Red Blood Count


 4.79 x10^6/uL


(4.30-5.70) 


 


 





 


Hemoglobin


 13.8 g/dL


(13.0-17.5) 


 


 





 


Hematocrit


 40.6 %


(39.0-53.0) 


 


 





 


Mean Corpuscular Volume 85 fL ()    


 


Mean Corpuscular Hemoglobin 29 pg (25-35)    


 


Mean Corpuscular Hemoglobin


Concent 34 g/dL


(31-37) 


 


 





 


Red Cell Distribution Width


 13.4 %


(11.5-14.5) 


 


 





 


Platelet Count


 177 x10^3/uL


(140-400) 


 


 





 


Neutrophils (%) (Auto) 61 % (31-73)    


 


Lymphocytes (%) (Auto) 25 % (24-48)    


 


Monocytes (%) (Auto) 12 % (0-9)    


 


Eosinophils (%) (Auto) 2 % (0-3)    


 


Basophils (%) (Auto) 1 % (0-3)    


 


Neutrophils # (Auto)


 3.3 x10^3/uL


(1.8-7.7) 


 


 





 


Lymphocytes # (Auto)


 1.4 x10^3/uL


(1.0-4.8) 


 


 





 


Monocytes # (Auto)


 0.7 x10^3/uL


(0.0-1.1) 


 


 





 


Eosinophils # (Auto)


 0.1 x10^3/uL


(0.0-0.7) 


 


 





 


Basophils # (Auto)


 0.0 x10^3/uL


(0.0-0.2) 


 


 





 


Prothrombin Time


 13.0 SEC


(11.7-14.0) 


 


 





 


Prothromb Time International


Ratio 1.0 (0.8-1.1) 


 


 


 





 


Activated Partial


Thromboplast Time 28 SEC (24-38) 


 


 


 





 


Sodium Level


 133 mmol/L


(136-145) 


 


 





 


Potassium Level


 3.6 mmol/L


(3.5-5.1) 


 


 





 


Chloride Level


 96 mmol/L


() 


 


 





 


Carbon Dioxide Level


 28 mmol/L


(21-32) 


 


 





 


Anion Gap


 9 (6-14) 


 


 16 mmol/L


(6-14) 





 


Blood Urea Nitrogen 9 mg/dL (8-26)    


 


Creatinine


 0.7 mg/dL


(0.7-1.3) 


 


 





 


Estimated GFR


(Cockcroft-Gault) 113.9 


 


 


 





 


BUN/Creatinine Ratio 13 (6-20)    


 


Glucose Level


 158 mg/dL


(70-99) 


 156 mg/dL


(70-99) 





 


Calcium Level


 8.3 mg/dL


(8.5-10.1) 


 


 





 


Magnesium Level


 1.9 mg/dL


(1.8-2.4) 


 


 





 


Total Bilirubin


 0.7 mg/dL


(0.2-1.0) 


 


 





 


Aspartate Amino Transf


(AST/SGOT) 28 U/L (15-37) 


 


 


 





 


Alanine Aminotransferase


(ALT/SGPT) 29 U/L (16-63) 


 


 


 





 


Alkaline Phosphatase


 148 U/L


() 


 


 





 


Troponin I Quantitative


 < 0.017 ng/mL


(0.000-0.055) 


 


 





 


NT-Pro-B-Type Natriuretic


Peptide 141 pg/mL


(0-124) 


 


 





 


Total Protein


 7.8 g/dL


(6.4-8.2) 


 


 





 


Albumin


 3.6 g/dL


(3.4-5.0) 


 


 





 


Albumin/Globulin Ratio 0.9 (1.0-1.7)    


 


Triglycerides Level


 63 mg/dL


(0-150) 


 


 





 


Cholesterol Level


 114 mg/dL


(0-200) 


 


 





 


LDL Cholesterol, Calculated


 67 mg/dL


(0-100) 


 


 





 


VLDL Cholesterol, Calculated


 13 mg/dL


(0-40) 


 


 





 


Non-HDL Cholesterol Calculated


 80 mg/dL


(0-129) 


 


 





 


HDL Cholesterol


 34 mg/dL


(40-60) 


 


 





 


Cholesterol/HDL Ratio 3.4    


 


Thyroid Stimulating Hormone


(TSH) 7.190 uIU/mL


(0.358-3.74) 


 


 





 


Glucose (Fingerstick)


 


 143 mg/dL


(70-99) 


 





 


Bedside Hemoglobin


 


 


 14.3 g/dL


(14-18) 





 


Bedside Hematocrit   42 % (37-52)  


 


Bedside Sodium


 


 


 134 mmol/L


(135-145) 





 


Bedside Potassium


 


 


 3.5 mmol/L


(3.5-5.0) 





 


Bedside Chloride


 


 


 94 mmol/L


() 





 


Bedside Total CO2


 


 


 28 mmol/L


(23-32) 





 


Bedside Blood Urea Nitrogen   9 mg/dL (8-26)  


 


Bedside Creatinine


 


 


 0.6 mg/dL


(0.5-1.4) 





 


Bedside Ionized Calcium (Fidel)


 


 


 1.04 mmol/L


(1.13-1.32) 





 


Urine Collection Type    Unknown 


 


Urine Color    Yellow 


 


Urine Clarity    Clear 


 


Urine pH    7.5 


 


Urine Specific Gravity    1.025 


 


Urine Protein


 


 


 


 30 mg/dL


(NEG-TRACE)


 


Urine Glucose (UA)


 


 


 


 Negative mg/dL


(NEG)


 


Urine Ketones (Stick)


 


 


 


 Negative mg/dL


(NEG)


 


Urine Blood    Negative (NEG) 


 


Urine Nitrite    Negative (NEG) 


 


Urine Bilirubin    Negative (NEG) 


 


Urine Urobilinogen Dipstick


 


 


 


 0.2 mg/dL (0.2


mg/dL)


 


Urine Leukocyte Esterase    Negative (NEG) 


 


Urine RBC


 


 


 


 Rare /HPF


(0-2)


 


Urine WBC


 


 


 


 Rare /HPF


(0-4)


 


Urine Squamous Epithelial


Cells 


 


 


 None /LPF 





 


Urine Bacteria    0 /HPF (0-FEW) 


 


Urine Hyaline Casts


 


 


 


 Occasional


/HPF


 


Test


 12/2/19


12:15 


 


 





 


Urine Opiates Screen Neg (NEG)    


 


Urine Methadone Screen Neg (NEG)    


 


Urine Barbiturates Neg (NEG)    


 


Urine Phencyclidine Screen Neg (NEG)    


 


Urine


Amphetamine/Methamphetamine Neg (NEG) 


 


 


 





 


Urine Benzodiazepines Screen Neg (NEG)    


 


Urine Cocaine Screen Neg (NEG)    


 


Urine Cannabinoids Screen Neg (NEG)    


 


Urine Ethyl Alcohol Neg (NEG)    








Laboratory Tests








Test


 12/2/19


05:15 12/2/19


05:21 12/2/19


05:27 12/2/19


06:27


 


White Blood Count


 5.5 x10^3/uL


(4.0-11.0) 


 


 





 


Red Blood Count


 4.79 x10^6/uL


(4.30-5.70) 


 


 





 


Hemoglobin


 13.8 g/dL


(13.0-17.5) 


 


 





 


Hematocrit


 40.6 %


(39.0-53.0) 


 


 





 


Mean Corpuscular Volume 85 fL ()    


 


Mean Corpuscular Hemoglobin 29 pg (25-35)    


 


Mean Corpuscular Hemoglobin


Concent 34 g/dL


(31-37) 


 


 





 


Red Cell Distribution Width


 13.4 %


(11.5-14.5) 


 


 





 


Platelet Count


 177 x10^3/uL


(140-400) 


 


 





 


Neutrophils (%) (Auto) 61 % (31-73)    


 


Lymphocytes (%) (Auto) 25 % (24-48)    


 


Monocytes (%) (Auto) 12 % (0-9)    


 


Eosinophils (%) (Auto) 2 % (0-3)    


 


Basophils (%) (Auto) 1 % (0-3)    


 


Neutrophils # (Auto)


 3.3 x10^3/uL


(1.8-7.7) 


 


 





 


Lymphocytes # (Auto)


 1.4 x10^3/uL


(1.0-4.8) 


 


 





 


Monocytes # (Auto)


 0.7 x10^3/uL


(0.0-1.1) 


 


 





 


Eosinophils # (Auto)


 0.1 x10^3/uL


(0.0-0.7) 


 


 





 


Basophils # (Auto)


 0.0 x10^3/uL


(0.0-0.2) 


 


 





 


Prothrombin Time


 13.0 SEC


(11.7-14.0) 


 


 





 


Prothromb Time International


Ratio 1.0 (0.8-1.1) 


 


 


 





 


Activated Partial


Thromboplast Time 28 SEC (24-38) 


 


 


 





 


Sodium Level


 133 mmol/L


(136-145) 


 


 





 


Potassium Level


 3.6 mmol/L


(3.5-5.1) 


 


 





 


Chloride Level


 96 mmol/L


() 


 


 





 


Carbon Dioxide Level


 28 mmol/L


(21-32) 


 


 





 


Anion Gap


 9 (6-14) 


 


 16 mmol/L


(6-14) 





 


Blood Urea Nitrogen 9 mg/dL (8-26)    


 


Creatinine


 0.7 mg/dL


(0.7-1.3) 


 


 





 


Estimated GFR


(Cockcroft-Gault) 113.9 


 


 


 





 


BUN/Creatinine Ratio 13 (6-20)    


 


Glucose Level


 158 mg/dL


(70-99) 


 156 mg/dL


(70-99) 





 


Calcium Level


 8.3 mg/dL


(8.5-10.1) 


 


 





 


Magnesium Level


 1.9 mg/dL


(1.8-2.4) 


 


 





 


Total Bilirubin


 0.7 mg/dL


(0.2-1.0) 


 


 





 


Aspartate Amino Transf


(AST/SGOT) 28 U/L (15-37) 


 


 


 





 


Alanine Aminotransferase


(ALT/SGPT) 29 U/L (16-63) 


 


 


 





 


Alkaline Phosphatase


 148 U/L


() 


 


 





 


Troponin I Quantitative


 < 0.017 ng/mL


(0.000-0.055) 


 


 





 


NT-Pro-B-Type Natriuretic


Peptide 141 pg/mL


(0-124) 


 


 





 


Total Protein


 7.8 g/dL


(6.4-8.2) 


 


 





 


Albumin


 3.6 g/dL


(3.4-5.0) 


 


 





 


Albumin/Globulin Ratio 0.9 (1.0-1.7)    


 


Triglycerides Level


 63 mg/dL


(0-150) 


 


 





 


Cholesterol Level


 114 mg/dL


(0-200) 


 


 





 


LDL Cholesterol, Calculated


 67 mg/dL


(0-100) 


 


 





 


VLDL Cholesterol, Calculated


 13 mg/dL


(0-40) 


 


 





 


Non-HDL Cholesterol Calculated


 80 mg/dL


(0-129) 


 


 





 


HDL Cholesterol


 34 mg/dL


(40-60) 


 


 





 


Cholesterol/HDL Ratio 3.4    


 


Thyroid Stimulating Hormone


(TSH) 7.190 uIU/mL


(0.358-3.74) 


 


 





 


Glucose (Fingerstick)


 


 143 mg/dL


(70-99) 


 





 


Bedside Hemoglobin


 


 


 14.3 g/dL


(14-18) 





 


Bedside Hematocrit   42 % (37-52)  


 


Bedside Sodium


 


 


 134 mmol/L


(135-145) 





 


Bedside Potassium


 


 


 3.5 mmol/L


(3.5-5.0) 





 


Bedside Chloride


 


 


 94 mmol/L


() 





 


Bedside Total CO2


 


 


 28 mmol/L


(23-32) 





 


Bedside Blood Urea Nitrogen   9 mg/dL (8-26)  


 


Bedside Creatinine


 


 


 0.6 mg/dL


(0.5-1.4) 





 


Bedside Ionized Calcium (Fidel)


 


 


 1.04 mmol/L


(1.13-1.32) 





 


Urine Collection Type    Unknown 


 


Urine Color    Yellow 


 


Urine Clarity    Clear 


 


Urine pH    7.5 


 


Urine Specific Gravity    1.025 


 


Urine Protein


 


 


 


 30 mg/dL


(NEG-TRACE)


 


Urine Glucose (UA)


 


 


 


 Negative mg/dL


(NEG)


 


Urine Ketones (Stick)


 


 


 


 Negative mg/dL


(NEG)


 


Urine Blood    Negative (NEG) 


 


Urine Nitrite    Negative (NEG) 


 


Urine Bilirubin    Negative (NEG) 


 


Urine Urobilinogen Dipstick


 


 


 


 0.2 mg/dL (0.2


mg/dL)


 


Urine Leukocyte Esterase    Negative (NEG) 


 


Urine RBC


 


 


 


 Rare /HPF


(0-2)


 


Urine WBC


 


 


 


 Rare /HPF


(0-4)


 


Urine Squamous Epithelial


Cells 


 


 


 None /LPF 





 


Urine Bacteria    0 /HPF (0-FEW) 


 


Urine Hyaline Casts


 


 


 


 Occasional


/HPF


 


Test


 12/2/19


12:15 


 


 





 


Urine Opiates Screen Neg (NEG)    


 


Urine Methadone Screen Neg (NEG)    


 


Urine Barbiturates Neg (NEG)    


 


Urine Phencyclidine Screen Neg (NEG)    


 


Urine


Amphetamine/Methamphetamine Neg (NEG) 


 


 


 





 


Urine Benzodiazepines Screen Neg (NEG)    


 


Urine Cocaine Screen Neg (NEG)    


 


Urine Cannabinoids Screen Neg (NEG)    


 


Urine Ethyl Alcohol Neg (NEG)    

















ANTONIO MONCADA MD                  Dec 2, 2019 15:07

## 2019-12-02 NOTE — PDOC2
CARDIAC CONSULT


DATE OF CONSULT


Date of Consult


DATE: 12/2/19 


TIME: 08:59





REASON FOR CONSULT


Reason for Consult:


Hypertension


Dizziness





REFERRING PHYSICIAN


Referring Physician:


Dr. Jama





SOURCE


Source:  Chart review, Patient





HISTORY OF PRESENT ILLNESS


HISTORY OF PRESENT ILLNESS


This is a 63 yo male who presented secondary to dizziness/lightheadedness and 

nausea. Patient reports he woke up this morning feel as usual. Sat down and was 

watching television this morning and room began to spin. Got in the shower as he

thought this may make him feel better. Began feeling worse so he got out and 

laid down on the cough. Continued to have significant dizziness so he called his

son to come over. Took blood pressure at home and was significantly elevated. 

Called EMS. Systolic blood pressure in 230 range upon EMS arrival. Does have a 

history of hypertension. Has not taken his antiHTN therapy since Thanksgiving 

day. Just was busy and forgot to take. Denies any chest pain, palpitations, 

diaphoresis, or SOA. Does feel mildly nauseated, especially when he is 

dizziness. Dizziness seems to be worse when he sits up.





PAST MEDICAL HISTORY


Past Medical History


Cardiovascular:  HTN


Pulmonary:  No pertinent hx


CENTRAL NERVOUS SYSTEM:  Other (no pertinent hx)


GI:  GERD


Heme/Onc:  No pertinent hx


Hepatobiliary:  No pertinent hx


Psych:  No pertinent hx


Musculoskeletal:  Osteoarthritis, Other


Rheumatologic:  No pertinent hx


Infectious disease:  No pertinent hx


ENT:  No pertinent hx


Renal/:  No pertinent hx


Endocrine:  Hypothyroidism


Dermatology:  No pertinent hx





PAST SURGICAL HISTORY


Past Surgical History


Other (back surgery, aortic aneurysm repair), tonsillectomy, cataract extraction





FAMILY HISTORY


Family History


Diabetes, Heart Disease, stroke





SOCIAL HISTORY


Smoke:  No


ALCOHOL:  none


Drugs:  None


Lives:  with Family





CURRENT MEDICATIONS


CURRENT MEDICATIONS





Current Medications








 Medications


  (Trade)  Dose


 Ordered  Sig/Stanton


 Route


 PRN Reason  Start Time


 Stop Time Status Last Admin


Dose Admin


 


 Sodium Chloride  1,000 ml @ 


 1,000 mls/hr  Q1H


 IV


   12/2/19 05:09


 12/2/19 06:08 DC 12/2/19 05:20





 


 Ondansetron HCl


  (Zofran)  4 mg  1X  ONCE


 IV


   12/2/19 05:30


 12/2/19 05:31 DC 12/2/19 05:24





 


 Hydralazine HCl


  (Apresoline Inj)  10 mg  1X  ONCE


 IVP


   12/2/19 05:45


 12/2/19 05:47 DC 12/2/19 05:30





 


 Metoclopramide HCl


  (Reglan Vial)  10 mg  1X  ONCE


 IVP


   12/2/19 06:15


 12/2/19 06:16 DC 12/2/19 06:06





 


 Iohexol


  (Omnipaque 350


 Mg/ml)  100 ml  1X  ONCE


 IV


   12/2/19 06:30


 12/2/19 06:31 DC 12/2/19 06:23














ALLERGIES


ALLERGIES:  


Coded Allergies:  


     No Known Drug Allergies (Unverified , 4/28/16)





ROS


Review of System


14 point ROS conducted with pertinent positives noted above in HPI.





PHYSICAL EXAM


PHYSICAL EXAM


General:  Alert, Oriented X3, Cooperative, No acute distress


HEENT:  Atraumatic, Mucous membr. moist/pink


Lungs:  Clear to auscultation, Normal air movement


Heart:  Regular rate, Normal S1, Normal S2, No murmurs


Abdomen:  Normal bowel sounds, No tenderness


Extremities:  No cyanosis, No edema


Skin:  No rashes, No breakdown, No significant lesion


Neuro:  Normal speech, Sensation intact


Psych/Mental Status:  Mental status NL


MUSCULOSKELETAL:  Osteoarthritic changes both hands





VITALS/I&O


VITALS/I&O:





                                   Vital Signs








  Date Time  Temp Pulse Resp B/P (MAP) Pulse Ox O2 Delivery O2 Flow Rate FiO2


 


12/2/19 08:00      Room Air  


 


12/2/19 07:00 97.2 70 18 151/80 (103) 97   





 97.2       














                                    I & O   


 


 12/1/19 12/2/19 12/2/19





 17:00 01:00 09:00


 


Intake Total   1000 ml


 


Output Total   550 ml


 


Balance   450 ml











LABS


Lab:





                                Laboratory Tests








Test


 12/2/19


05:15 12/2/19


05:21 12/2/19


05:27 12/2/19


06:27


 


White Blood Count


 5.5 x10^3/uL


(4.0-11.0) 


 


 





 


Red Blood Count


 4.79 x10^6/uL


(4.30-5.70) 


 


 





 


Hemoglobin


 13.8 g/dL


(13.0-17.5) 


 


 





 


Hematocrit


 40.6 %


(39.0-53.0) 


 


 





 


Mean Corpuscular Volume


 85 fL ()


 


 


 





 


Mean Corpuscular Hemoglobin 29 pg (25-35)     


 


Mean Corpuscular Hemoglobin


Concent 34 g/dL


(31-37) 


 


 





 


Red Cell Distribution Width


 13.4 %


(11.5-14.5) 


 


 





 


Platelet Count


 177 x10^3/uL


(140-400) 


 


 





 


Neutrophils (%) (Auto) 61 % (31-73)     


 


Lymphocytes (%) (Auto) 25 % (24-48)     


 


Monocytes (%) (Auto) 12 % (0-9)  H   


 


Eosinophils (%) (Auto) 2 % (0-3)     


 


Basophils (%) (Auto) 1 % (0-3)     


 


Neutrophils # (Auto)


 3.3 x10^3/uL


(1.8-7.7) 


 


 





 


Lymphocytes # (Auto)


 1.4 x10^3/uL


(1.0-4.8) 


 


 





 


Monocytes # (Auto)


 0.7 x10^3/uL


(0.0-1.1) 


 


 





 


Eosinophils # (Auto)


 0.1 x10^3/uL


(0.0-0.7) 


 


 





 


Basophils # (Auto)


 0.0 x10^3/uL


(0.0-0.2) 


 


 





 


Prothrombin Time


 13.0 SEC


(11.7-14.0) 


 


 





 


Prothrombin Time INR 1.0 (0.8-1.1)     


 


Activated Partial


Thromboplast Time 28 SEC (24-38)


 


 


 





 


Sodium Level


 133 mmol/L


(136-145)  L 


 


 





 


Potassium Level


 3.6 mmol/L


(3.5-5.1) 


 


 





 


Chloride Level


 96 mmol/L


()  L 


 


 





 


Carbon Dioxide Level


 28 mmol/L


(21-32) 


 


 





 


Anion Gap


 9 (6-14)  


 


 16 mmol/L


(6-14)  H 





 


Blood Urea Nitrogen


 9 mg/dL (8-26)


 


 


 





 


Creatinine


 0.7 mg/dL


(0.7-1.3) 


 


 





 


Estimated GFR


(Cockcroft-Gault) 113.9  


 


 


 





 


BUN/Creatinine Ratio 13 (6-20)     


 


Glucose Level


 158 mg/dL


(70-99)  H 


 156 mg/dL


(70-99)  H 





 


Calcium Level


 8.3 mg/dL


(8.5-10.1)  L 


 


 





 


Magnesium Level


 1.9 mg/dL


(1.8-2.4) 


 


 





 


Total Bilirubin


 0.7 mg/dL


(0.2-1.0) 


 


 





 


Aspartate Amino Transferase


(AST) 28 U/L (15-37)


 


 


 





 


Alanine Aminotransferase (ALT)


 29 U/L (16-63)


 


 


 





 


Alkaline Phosphatase


 148 U/L


()  H 


 


 





 


Troponin I Quantitative


 < 0.017 ng/mL


(0.000-0.055) 


 


 





 


NT-Pro-B-Type Natriuretic


Peptide 141 pg/mL


(0-124)  H 


 


 





 


Total Protein


 7.8 g/dL


(6.4-8.2) 


 


 





 


Albumin


 3.6 g/dL


(3.4-5.0) 


 


 





 


Albumin/Globulin Ratio


 0.9 (1.0-1.7)


L 


 


 





 


Glucose (Fingerstick)


 


 143 mg/dL


(70-99)  H 


 





 


POC Hemoglobin


 


 


 14.3 g/dL


(14-18) 





 


POC Hematocrit   42 % (37-52)   


 


POC Sodium


 


 


 134 mmol/L


(135-145)  L 





 


POC Potassium


 


 


 3.5 mmol/L


(3.5-5.0) 





 


POC Chloride


 


 


 94 mmol/L


()  L 





 


POC Total CO2


 


 


 28 mmol/L


(23-32) 





 


POC Blood Urea Nitrogen


 


 


 9 mg/dL (8-26)


 





 


POC Creatinine


 


 


 0.6 mg/dL


(0.5-1.4) 





 


POC Ionized Calcium (Fidel)


 


 


 1.04 mmol/L


(1.13-1.32)  L 





 


Urine Collection Type    Unknown  


 


Urine Color    Yellow  


 


Urine Clarity    Clear  


 


Urine pH    7.5  


 


Urine Specific Gravity    1.025  


 


Urine Protein


 


 


 


 30 mg/dL


(NEG-TRACE)


 


Urine Glucose (UA)


 


 


 


 Negative mg/dL


(NEG)


 


Urine Ketones (Stick)


 


 


 


 Negative mg/dL


(NEG)


 


Urine Blood


 


 


 


 Negative (NEG)





 


Urine Nitrite


 


 


 


 Negative (NEG)





 


Urine Bilirubin


 


 


 


 Negative (NEG)





 


Urine Urobilinogen Dipstick


 


 


 


 0.2 mg/dL (0.2


mg/dL)


 


Urine Leukocyte Esterase


 


 


 


 Negative (NEG)





 


Urine RBC


 


 


 


 Rare /HPF


(0-2)


 


Urine WBC


 


 


 


 Rare /HPF


(0-4)


 


Urine Squamous Epithelial


Cells 


 


 


 None /LPF  





 


Urine Bacteria


 


 


 


 0 /HPF (0-FEW)





 


Urine Hyaline Casts


 


 


 


 Occasional


/HPF





                                Laboratory Tests


12/2/19 05:15








                                Laboratory Tests


12/2/19 05:15








12/2/19 05:27











STRESS TEST


STRESS TEST


Conclusion


1. Negative stress EKG.


2. Normal myocardial perfusion study.


3. Normal EF with stress. EF 60%


4. Average exercise capacity at 7.0 mets


5. Low risk study.





DATE:     04/29/16 1351





ASSESSMENT/PLAN


ASSESSMENT/PLAN


1.  Dizziness; CT head without acute findings. CTA head/neck without significant

stenosis. MRI ordered 


2.  Hypertensive urgency; due to missed medications. Has not taken meds since 

this past Thursday. Remains elevated


3.  Metabolic encephalopathy; secondary to #2


4.  Hypermagnesemia


5.  Hypothyroidism


6.  AAA s/p repair 








Recommendations





Resume home antiHTN therapy; titrate as warranted


Hydralazine IV PRN


TSH, lipids


Orthostatic VS


Echo to assess LV systolic function


Supportive care











JEB ROJAS            Dec 2, 2019 11:07

## 2019-12-02 NOTE — NUR
SS following up with discharge planning. Pt's family requesting transfer to . SS contacted 
 transfer team, [phone]; fax [phone], and made request for transfer. SS faxed 
records as requested and had radiology, [phone], cloud images. Physician notified. SS will 
await acceptance decision and will proceed accordingly.

## 2019-12-02 NOTE — RAD
MRI of the brain without contrast 12/2/2019

 

Clinical History: Dizziness.

 

Technique: Unenhanced T1-weighted sagittal and axial, T2-weighted axial 

and coronal and FLAIR, gradient echo and diffusion-weighted axial images 

of the brain were obtained.

 

Findings: Comparison is made to the patient's CT scan of the head 

performed earlier today.

 

There is generalized parenchymal atrophy. Patchy, confluent and multiple 

focal areas of increased signal intensity are seen within the 

periventricular and subcortical white matter of both cerebral hemispheres 

on the FLAIR and T2-weighted images consistent with areas of small vessel 

ischemic disease. A 4 mm old area of lacunar infarction is seen involving 

the right aspect of the reynaldo Focal areas of decreased signal intensity are

seen on the gradient echo images involving the posterior right temporal 

lobe which measure 2 to 3 mm in size. There is no surrounding edema or 

associated mass effect. These are felt to most likely represent cavernous 

angiomas.

 

No acute parenchymal abnormality is seen. No extra-axial fluid collection 

is seen. There is no MRI evidence of acute ischemia/infarction.

 

Mild mucosal thickening in seen scattered throughout the sinuses. There 

are small bilateral mastoid effusions. Normal flow voids are seen within 

the major vascular structures surrounding the brain parenchyma.

 

Impression: No acute parenchymal abnormality is seen.

 

Electronically signed by: Terrence Angeles MD (12/2/2019 4:01 PM) [id number]

## 2019-12-02 NOTE — RAD
EXAM: CT Head without IV contrast

 

CLINICAL HISTORY:

 

COMPARISON: None.

 

TECHNIQUE:

Routine CT of the head without contrast. Soft tissues and bone windows 

were reviewed.

 

PQRS compliance statement - One or more of the following individualized 

dose reduction techniques were utilized for this study:

1.  Automated exposure control

2.  Adjustment of the mA and/or kV according to patient size

3.  Use of iterative reconstruction technique

 

FINDINGS:  

There is no evidence of hemorrhage, mass or extra-axial fluid collection.

 

Grey-white differentiation is maintained with no evidence of edema. s 

low-attenuation within the subcortical, periventricular and deep white 

matter likely changes of chronic small vessel disease.

 

There is no mass effect or shift of the intracranial structures.

 

The ventricles, basilar cisterns and cortical sulci are normal in size and

configuration for the patients stated age.

 

The cerebellum and brainstem are unremarkable.  

 

The calvarium demonstrates no evidence of fracture or focal lesion.

 

There is normal aeration of the visualized paranasal sinuses and mastoid 

air cells.

 

The visualized portions of the orbits are normal.

 

Atherosclerotic calcifications of the intracranial internal carotid and 

vertebral arteries is seen.

 

IMPRESSION:

1.  No evidence for acute intracranial hemorrhage

2.  White matter changes likely chronic small vessel disease

 

 

 

Findings discussed with ER physician at 12/2/2019 5:56 AM.

 

**********FOR INTERNAL CODING PURPOSES**********

 

 

RESULT CODE: (C)  

 

 

 

 

 

Electronically signed by: Cj Pelayo MD (12/2/2019 5:56 AM) [id number]

## 2020-07-25 ENCOUNTER — TELEPHONE ENCOUNTER (OUTPATIENT)
Dept: URBAN - METROPOLITAN AREA CLINIC 13 | Facility: CLINIC | Age: 65
End: 2020-07-25

## 2020-07-25 RX ORDER — POLYETHYLENE GLYCOL 3350, SODIUM CHLORIDE, SODIUM BICARBONATE AND POTASSIUM CHLORIDE WITH LEMON FLAVOR 420; 11.2; 5.72; 1.48 G/4L; G/4L; G/4L; G/4L
USE AS DIRECTED POWDER, FOR SOLUTION ORAL
Qty: 1 | Refills: 0 | OUTPATIENT
Start: 2014-03-10 | End: 2014-04-30

## 2020-07-26 ENCOUNTER — TELEPHONE ENCOUNTER (OUTPATIENT)
Dept: URBAN - METROPOLITAN AREA CLINIC 13 | Facility: CLINIC | Age: 65
End: 2020-07-26

## 2020-07-26 RX ORDER — ESOMEPRAZOLE MAGNESIUM 40 MG
TAKE 1 CAPSULE DAILY CAPSULE,DELAYED RELEASE (ENTERIC COATED) ORAL
Qty: 90 | Refills: 3 | Status: ACTIVE | COMMUNITY

## 2020-07-26 RX ORDER — ATORVASTATIN CALCIUM 40 MG/1
TAKE 1 TABLET DAILY TABLET, FILM COATED ORAL
Refills: 0 | Status: ACTIVE | COMMUNITY

## 2020-07-26 RX ORDER — LISINOPRIL 20 MG/1
TAKE 1 TABLET DAILY TABLET ORAL
Refills: 0 | Status: ACTIVE | COMMUNITY

## 2020-07-26 RX ORDER — MORPHINE SULFATE 10 MG/1
TAKE 1 CAPSULE 3 TIMES DAILY CAPSULE, EXTENDED RELEASE ORAL
Refills: 0 | Status: ACTIVE | COMMUNITY

## 2020-07-26 RX ORDER — HYDROCODONE BITARTRATE AND ACETAMINOPHEN 10; 325 MG/1; MG/1
TAKE 1 TABLET EVERY 8 HOURS AS NEEDED FOR PAIN TABLET ORAL
Qty: 18 | Refills: 0 | Status: ACTIVE | COMMUNITY

## 2020-07-26 RX ORDER — DOCUSATE SODIUM 100 MG/1
TAKE 1 CAPSULE TWICE DAILY AS NEEDED CAPSULE, LIQUID FILLED ORAL
Refills: 0 | Status: ACTIVE | COMMUNITY

## 2020-07-26 RX ORDER — IBUPROFEN 800 MG/1
TAKE 1 TABLET 3 TIMES DAILY AS NEEDED TABLET ORAL
Refills: 0 | Status: ACTIVE | COMMUNITY

## 2020-07-26 RX ORDER — ASPIRIN 81 MG
TAKE 1 TABLET DAILY TABLET, DELAYED RELEASE (ENTERIC COATED) ORAL
Refills: 0 | Status: ACTIVE | COMMUNITY

## 2020-07-26 RX ORDER — FENOFIBRATE 145 MG/1
TAKE 1 TABLET DAILY TABLET, FILM COATED ORAL
Refills: 0 | Status: ACTIVE | COMMUNITY

## 2021-05-26 ENCOUNTER — HOSPITAL ENCOUNTER (OUTPATIENT)
Dept: HOSPITAL 61 - ER | Age: 66
Setting detail: OBSERVATION
LOS: 1 days | Discharge: HOME HEALTH | End: 2021-05-27
Attending: INTERNAL MEDICINE | Admitting: INTERNAL MEDICINE
Payer: COMMERCIAL

## 2021-05-26 VITALS — WEIGHT: 218.48 LBS | HEIGHT: 67 IN | BODY MASS INDEX: 34.29 KG/M2

## 2021-05-26 VITALS — DIASTOLIC BLOOD PRESSURE: 57 MMHG | SYSTOLIC BLOOD PRESSURE: 118 MMHG

## 2021-05-26 VITALS — SYSTOLIC BLOOD PRESSURE: 158 MMHG | DIASTOLIC BLOOD PRESSURE: 90 MMHG

## 2021-05-26 VITALS — DIASTOLIC BLOOD PRESSURE: 76 MMHG | SYSTOLIC BLOOD PRESSURE: 140 MMHG

## 2021-05-26 VITALS — SYSTOLIC BLOOD PRESSURE: 129 MMHG | DIASTOLIC BLOOD PRESSURE: 78 MMHG

## 2021-05-26 DIAGNOSIS — J43.9: ICD-10-CM

## 2021-05-26 DIAGNOSIS — E86.1: ICD-10-CM

## 2021-05-26 DIAGNOSIS — K22.0: ICD-10-CM

## 2021-05-26 DIAGNOSIS — R29.700: ICD-10-CM

## 2021-05-26 DIAGNOSIS — R13.10: ICD-10-CM

## 2021-05-26 DIAGNOSIS — G45.9: Primary | ICD-10-CM

## 2021-05-26 DIAGNOSIS — Z95.1: ICD-10-CM

## 2021-05-26 DIAGNOSIS — I63.81: ICD-10-CM

## 2021-05-26 DIAGNOSIS — R29.810: ICD-10-CM

## 2021-05-26 DIAGNOSIS — I10: ICD-10-CM

## 2021-05-26 DIAGNOSIS — R42: ICD-10-CM

## 2021-05-26 DIAGNOSIS — K21.9: ICD-10-CM

## 2021-05-26 DIAGNOSIS — E03.9: ICD-10-CM

## 2021-05-26 DIAGNOSIS — M62.81: ICD-10-CM

## 2021-05-26 DIAGNOSIS — I25.10: ICD-10-CM

## 2021-05-26 DIAGNOSIS — Z98.890: ICD-10-CM

## 2021-05-26 DIAGNOSIS — Z79.899: ICD-10-CM

## 2021-05-26 DIAGNOSIS — Z86.73: ICD-10-CM

## 2021-05-26 DIAGNOSIS — M19.90: ICD-10-CM

## 2021-05-26 DIAGNOSIS — E87.1: ICD-10-CM

## 2021-05-26 DIAGNOSIS — Z79.82: ICD-10-CM

## 2021-05-26 DIAGNOSIS — I73.9: ICD-10-CM

## 2021-05-26 DIAGNOSIS — E78.5: ICD-10-CM

## 2021-05-26 LAB
AMPHETAMINE/METHAMPHETAMINE: (no result)
ANION GAP SERPL CALC-SCNC: 5 MMOL/L (ref 6–14)
APTT BLD: 29 SEC (ref 24–38)
BARBITURATES UR-MCNC: (no result) UG/ML
BASOPHILS # BLD AUTO: 0.1 X10^3/UL (ref 0–0.2)
BASOPHILS NFR BLD: 1 % (ref 0–3)
BENZODIAZ UR-MCNC: (no result) UG/L
BUN SERPL-MCNC: 14 MG/DL (ref 8–26)
CALCIUM SERPL-MCNC: 8.5 MG/DL (ref 8.5–10.1)
CANNABINOIDS UR-MCNC: (no result) UG/L
CHLORIDE SERPL-SCNC: 93 MMOL/L (ref 98–107)
CO2 SERPL-SCNC: 31 MMOL/L (ref 21–32)
COCAINE UR-MCNC: (no result) NG/ML
CREAT SERPL-MCNC: 0.7 MG/DL (ref 0.7–1.3)
EOSINOPHIL NFR BLD: 0.2 X10^3/UL (ref 0–0.7)
EOSINOPHIL NFR BLD: 3 % (ref 0–3)
ERYTHROCYTE [DISTWIDTH] IN BLOOD BY AUTOMATED COUNT: 13.2 % (ref 11.5–14.5)
GFR SERPLBLD BASED ON 1.73 SQ M-ARVRAT: 113.5 ML/MIN
GLUCOSE SERPL-MCNC: 121 MG/DL (ref 70–99)
HCT VFR BLD CALC: 37.8 % (ref 39–53)
HGB BLD-MCNC: 13.1 G/DL (ref 13–17.5)
LYMPHOCYTES # BLD: 1.6 X10^3/UL (ref 1–4.8)
LYMPHOCYTES NFR BLD AUTO: 24 % (ref 24–48)
MAGNESIUM SERPL-MCNC: 1.8 MG/DL (ref 1.8–2.4)
MCH RBC QN AUTO: 29 PG (ref 25–35)
MCHC RBC AUTO-ENTMCNC: 35 G/DL (ref 31–37)
MCV RBC AUTO: 85 FL (ref 79–100)
METHADONE SERPL-MCNC: (no result) NG/ML
MONO #: 0.9 X10^3/UL (ref 0–1.1)
MONOCYTES NFR BLD: 14 % (ref 0–9)
NEUT #: 4 X10^3/UL (ref 1.8–7.7)
NEUTROPHILS NFR BLD AUTO: 59 % (ref 31–73)
OPIATES UR-MCNC: (no result) NG/ML
PCP SERPL-MCNC: (no result) MG/DL
PHOSPHATE SERPL-MCNC: 3 MG/DL (ref 2.6–4.7)
PLATELET # BLD AUTO: 203 X10^3/UL (ref 140–400)
POTASSIUM SERPL-SCNC: 3.6 MMOL/L (ref 3.5–5.1)
PROTHROMBIN TIME: 13.2 SEC (ref 11.7–14)
RBC # BLD AUTO: 4.46 X10^6/UL (ref 4.3–5.7)
SODIUM SERPL-SCNC: 129 MMOL/L (ref 136–145)
WBC # BLD AUTO: 6.7 X10^3/UL (ref 4–11)

## 2021-05-26 PROCEDURE — 97535 SELF CARE MNGMENT TRAINING: CPT

## 2021-05-26 PROCEDURE — 70551 MRI BRAIN STEM W/O DYE: CPT

## 2021-05-26 PROCEDURE — G0378 HOSPITAL OBSERVATION PER HR: HCPCS

## 2021-05-26 PROCEDURE — 85610 PROTHROMBIN TIME: CPT

## 2021-05-26 PROCEDURE — 83930 ASSAY OF BLOOD OSMOLALITY: CPT

## 2021-05-26 PROCEDURE — 83935 ASSAY OF URINE OSMOLALITY: CPT

## 2021-05-26 PROCEDURE — 93005 ELECTROCARDIOGRAM TRACING: CPT

## 2021-05-26 PROCEDURE — 92610 EVALUATE SWALLOWING FUNCTION: CPT

## 2021-05-26 PROCEDURE — 96372 THER/PROPH/DIAG INJ SC/IM: CPT

## 2021-05-26 PROCEDURE — 80061 LIPID PANEL: CPT

## 2021-05-26 PROCEDURE — 96365 THER/PROPH/DIAG IV INF INIT: CPT

## 2021-05-26 PROCEDURE — 97161 PT EVAL LOW COMPLEX 20 MIN: CPT

## 2021-05-26 PROCEDURE — 99285 EMERGENCY DEPT VISIT HI MDM: CPT

## 2021-05-26 PROCEDURE — 93306 TTE W/DOPPLER COMPLETE: CPT

## 2021-05-26 PROCEDURE — 97166 OT EVAL MOD COMPLEX 45 MIN: CPT

## 2021-05-26 PROCEDURE — 84100 ASSAY OF PHOSPHORUS: CPT

## 2021-05-26 PROCEDURE — 85025 COMPLETE CBC W/AUTO DIFF WBC: CPT

## 2021-05-26 PROCEDURE — 82962 GLUCOSE BLOOD TEST: CPT

## 2021-05-26 PROCEDURE — 97116 GAIT TRAINING THERAPY: CPT

## 2021-05-26 PROCEDURE — 96366 THER/PROPH/DIAG IV INF ADDON: CPT

## 2021-05-26 PROCEDURE — 70450 CT HEAD/BRAIN W/O DYE: CPT

## 2021-05-26 PROCEDURE — 96361 HYDRATE IV INFUSION ADD-ON: CPT

## 2021-05-26 PROCEDURE — 83735 ASSAY OF MAGNESIUM: CPT

## 2021-05-26 PROCEDURE — G0379 DIRECT REFER HOSPITAL OBSERV: HCPCS

## 2021-05-26 PROCEDURE — 82607 VITAMIN B-12: CPT

## 2021-05-26 PROCEDURE — 84443 ASSAY THYROID STIM HORMONE: CPT

## 2021-05-26 PROCEDURE — 70496 CT ANGIOGRAPHY HEAD: CPT

## 2021-05-26 PROCEDURE — 70498 CT ANGIOGRAPHY NECK: CPT

## 2021-05-26 PROCEDURE — 36415 COLL VENOUS BLD VENIPUNCTURE: CPT

## 2021-05-26 PROCEDURE — 80307 DRUG TEST PRSMV CHEM ANLYZR: CPT

## 2021-05-26 PROCEDURE — 85730 THROMBOPLASTIN TIME PARTIAL: CPT

## 2021-05-26 PROCEDURE — 97110 THERAPEUTIC EXERCISES: CPT

## 2021-05-26 PROCEDURE — 84484 ASSAY OF TROPONIN QUANT: CPT

## 2021-05-26 PROCEDURE — 80048 BASIC METABOLIC PNL TOTAL CA: CPT

## 2021-05-26 RX ADMIN — BACITRACIN SCH MLS/HR: 5000 INJECTION, POWDER, FOR SOLUTION INTRAMUSCULAR at 17:00

## 2021-05-26 RX ADMIN — CARVEDILOL SCH MG: 6.25 TABLET, FILM COATED ORAL at 17:39

## 2021-05-26 RX ADMIN — PANTOPRAZOLE SODIUM SCH MG: 40 TABLET, DELAYED RELEASE ORAL at 17:38

## 2021-05-26 NOTE — RAD
EXAM: Head CT without contrast.



HISTORY: Stroke. Right upper extremity weakness.



TECHNIQUE: Computed tomographic images of the head were obtained without contrast.



*One or more of the following individualized dose reduction techniques were utilized for this examina
tion:  

1. Automated exposure control.  

2. Adjustment of the mA and/or kV according to patient size.  

3. Use of iterative reconstruction technique.



COMPARISON: 12/2/2019.



FINDINGS: There is no acute or subacute extra-axial or intraparenchymal hemorrhage. There is no mass 
effect or midline shift. There is no hydrocephalus. 



There are extensive areas of decreased attenuation within the cerebral white matter, nonspecific and 
likely related to chronic small vessel disease and small chronic infarcts. There is cerebral volume l
oss.



There is evidence of lens surgery. The visualized paranasal sinuses mastoid air cells are unremarkabl
e. There is no suspicious calvarial lesion.



IMPRESSION:

1. No acute intracranial finding. Note is made that MRI is more sensitive for acute infarction.

2. Extensive bilateral cerebral white matter changes, likely due to the combination of chronic small 
vessel disease and chronic infarcts.

3. Cerebral volume loss.



Findings were discussed with Dr. Raman in the ED at 0900 hours on 5/26/2021.



**********FOR INTERNAL CODING PURPOSES**********





RESULT CODE: (FUP)  







Electronically signed by: Evelia Cheng MD (5/26/2021 9:05 AM) [id number]

## 2021-05-26 NOTE — PDOC2
NEUROLOGY CONSULT


Date of Service


DOS:


DATE: 5/26/21 


TIME: 14:27





Reason for Consult


Reason for Consult:


Stroke symptoms





Referring Physician


Referring Physician:


Dr. Mercado





Source


Source:  Caregiver (Wife), Chart review, Patient





History of Present Illness


History of Present Illness


The patient is a 65-year-old right-handed male who came to the emergency 

department this morning with right arm numbness with which he awoke this 

morning.  Last known normal was last night.  He also notes some numbness and 

weakness of the right face.  There is no prior history of stroke, seizure, or 

head injury.  He does have a long history of achalasia with dysphagia as well as

back pain with gait disorder.  He just had a PET scan this morning at  to 

work-up pulmonary nodules.





Past Medical History


Cardiovascular:  HTN, Hyperlipidemia


GI:  Other (Achalasia, gastric reflux)


Musculoskeletal:   low back pain, Osteoarthritis


Rheumatologic:  Other (Lupus)


Renal/:  Other (Dysuria)


Endocrine:  Hypothyroidism





Past Surgical History


Past Surgical History:  CABG, Other (Lumbar, tonsillectomy, multiple esophageal 

procedures)





Family History


Family History:  Cancer





Social History


Social History


, non-smoker, , no alcohol





Current Medications


Current Medications





Current Medications


Ondansetron HCl (Zofran) 4 mg PRN Q8HRS  PRN IV NAUSEA/VOMITING;  Start 5/26/21 

at 10:15;  Stop 5/26/21 at 13:41;  Status DC


Sodium Chloride 1,000 ml @  100 mls/hr 1X  ONCE IV  Last administered on 

5/26/21at 11:45;  Start 5/26/21 at 11:45;  Stop 5/26/21 at 21:44


Ondansetron HCl (Zofran) 4 mg PRN Q4HRS  PRN IV NAUSEA/VOMITING;  Start 5/26/21 

at 13:45


Levothyroxine Sodium (Synthroid) 88 mcg DAILYAC PO ;  Start 5/27/21 at 07:30


Lisinopril (Prinivil) 40 mg DAILY PO ;  Start 5/27/21 at 09:00


Pantoprazole Sodium (Protonix) 40 mg BIDAC PO ;  Start 5/26/21 at 16:30


Magnesium Sulfate 50 ml @ 25 mls/hr 1X  ONCE IV ;  Start 5/26/21 at 13:45;  Stop

5/26/21 at 15:44





Active Scripts


Active


Hydrochlorothiazide Tablet (Hydrochlorothiazide) 12.5 Mg Tablet 1 Tab PO DAILY


Lisinopril 40 Mg Tablet 40 Mg PO DAILY


Reported


Meclizine Hcl 12.5 Mg Tablet 1 Tab PO TID


Protonix (Pantoprazole Sodium) 20 Mg Tablet.dr 40 Mg PO BID


Levothyroxine Sodium 88 Mcg Tablet 88 Mcg PO DAILYAC





Allergies


Allergies:  


Coded Allergies:  


     No Known Drug Allergies (Unverified , 4/28/16)





ROS


Review of System


Negative for fever, chills, weight loss, shortness of breath, chest pain, 

indigestion, hematochezia, melena, and dysuria.  Full 14-point review of systems

is negative.





Physical Exam


Physical Examination


General:


Well-developed, well-nourished white male in no acute distress


HEENT:


Normocephalic andatraumatic. Temporal arteriespulsatile and nontender.


Neck:


Supple without bruit, no meningismus


Musculoskeletal:


Stability:see neurologic. Gait exam:see neurologic. Tone:see 

neurologic.Strength:see neurologic.


Neurological:


Mental Status:intact, orientation, memory, attention span/concentration, 

language, fund of knowledge normal. Cranial Nerves:Pupils equal and reactive to

light, extraocular movements areintact, visual fields are full to 

confrontation. Facial sensation is normal. There is a slight right central 

facial weakness. Vestibulo-ocular reflex is intact. Palate elevates and tongue 

protrudes in midline.  Patient swallowed liquid for me without difficulty. All 

other cranial related problems are negative except as mentioned 

before.Reflexes:2+ and symmetric with flexor plantar responses. Motor:5-/5 

right arm weakness, otherwise normal strength with normal tone and bulk. 

Coordination:Finger-nose finger and heel-to-shin testing are normal. Rapid 

alternating movements and fine finger movements are intact. Gait: Arthritic and 

antalgic. Sensory:Normal pinprick, vibration, light touch, proprioception.





Vitals


VITALS





Vital Signs








  Date Time  Temp Pulse Resp B/P (MAP) Pulse Ox O2 Delivery O2 Flow Rate FiO2


 


5/26/21 13:27      Room Air  


 


5/26/21 11:00 97.9 69 16 158/90 (112) 97   





 97.9       











Labs


Labs





Laboratory Tests








Test


 5/26/21


08:43 5/26/21


08:50 5/26/21


09:50 5/26/21


09:54


 


Glucose (Fingerstick)


 125 mg/dL


(70-99) 


 


 





 


White Blood Count


 


 6.7 x10^3/uL


(4.0-11.0) 


 





 


Red Blood Count


 


 4.46 x10^6/uL


(4.30-5.70) 


 





 


Hemoglobin


 


 13.1 g/dL


(13.0-17.5) 


 





 


Hematocrit


 


 37.8 %


(39.0-53.0) 


 





 


Mean Corpuscular Volume  85 fL ()   


 


Mean Corpuscular Hemoglobin  29 pg (25-35)   


 


Mean Corpuscular Hemoglobin


Concent 


 35 g/dL


(31-37) 


 





 


Red Cell Distribution Width


 


 13.2 %


(11.5-14.5) 


 





 


Platelet Count


 


 203 x10^3/uL


(140-400) 


 





 


Neutrophils (%) (Auto)  59 % (31-73)   


 


Lymphocytes (%) (Auto)  24 % (24-48)   


 


Monocytes (%) (Auto)  14 % (0-9)   


 


Eosinophils (%) (Auto)  3 % (0-3)   


 


Basophils (%) (Auto)  1 % (0-3)   


 


Neutrophils # (Auto)


 


 4.0 x10^3/uL


(1.8-7.7) 


 





 


Lymphocytes # (Auto)


 


 1.6 x10^3/uL


(1.0-4.8) 


 





 


Monocytes # (Auto)


 


 0.9 x10^3/uL


(0.0-1.1) 


 





 


Eosinophils # (Auto)


 


 0.2 x10^3/uL


(0.0-0.7) 


 





 


Basophils # (Auto)


 


 0.1 x10^3/uL


(0.0-0.2) 


 





 


Sodium Level


 


 129 mmol/L


(136-145) 


 





 


Potassium Level


 


 3.6 mmol/L


(3.5-5.1) 


 





 


Chloride Level


 


 93 mmol/L


() 


 





 


Carbon Dioxide Level


 


 31 mmol/L


(21-32) 


 





 


Anion Gap  5 (6-14)   


 


Blood Urea Nitrogen


 


 14 mg/dL


(8-26) 


 





 


Creatinine


 


 0.7 mg/dL


(0.7-1.3) 


 





 


Estimated GFR


(Cockcroft-Gault) 


 113.5 


 


 





 


Glucose Level


 


 121 mg/dL


(70-99) 


 





 


Calcium Level


 


 8.5 mg/dL


(8.5-10.1) 


 





 


Phosphorus Level


 


 3.0 mg/dL


(2.6-4.7) 


 





 


Magnesium Level


 


 1.8 mg/dL


(1.8-2.4) 


 





 


Troponin I Quantitative


 


 < 0.017 ng/mL


(0.000-0.055) 


 





 


Ethyl Alcohol Level


 


 


 < 10 mg/dL


(0-10) 





 


Prothrombin Time


 


 


 


 13.2 SEC


(11.7-14.0)


 


Prothromb Time International


Ratio 


 


 


 1.0 (0.8-1.1) 





 


Activated Partial


Thromboplast Time 


 


 


 29 SEC (24-38) 





 


Test


 5/26/21


10:17 


 


 





 


Urine Opiates Screen Neg (NEG)    


 


Urine Methadone Screen Neg (NEG)    


 


Urine Barbiturates Neg (NEG)    


 


Urine Phencyclidine Screen Neg (NEG)    


 


Urine


Amphetamine/Methamphetamine Neg (NEG) 


 


 


 





 


Urine Benzodiazepines Screen Neg (NEG)    


 


Urine Cocaine Screen Neg (NEG)    


 


Urine Cannabinoids Screen Neg (NEG)    


 


Urine Ethyl Alcohol Neg (NEG)    








Laboratory Tests








Test


 5/26/21


08:43 5/26/21


08:50 5/26/21


09:50 5/26/21


09:54


 


Glucose (Fingerstick)


 125 mg/dL


(70-99) 


 


 





 


White Blood Count


 


 6.7 x10^3/uL


(4.0-11.0) 


 





 


Red Blood Count


 


 4.46 x10^6/uL


(4.30-5.70) 


 





 


Hemoglobin


 


 13.1 g/dL


(13.0-17.5) 


 





 


Hematocrit


 


 37.8 %


(39.0-53.0) 


 





 


Mean Corpuscular Volume  85 fL ()   


 


Mean Corpuscular Hemoglobin  29 pg (25-35)   


 


Mean Corpuscular Hemoglobin


Concent 


 35 g/dL


(31-37) 


 





 


Red Cell Distribution Width


 


 13.2 %


(11.5-14.5) 


 





 


Platelet Count


 


 203 x10^3/uL


(140-400) 


 





 


Neutrophils (%) (Auto)  59 % (31-73)   


 


Lymphocytes (%) (Auto)  24 % (24-48)   


 


Monocytes (%) (Auto)  14 % (0-9)   


 


Eosinophils (%) (Auto)  3 % (0-3)   


 


Basophils (%) (Auto)  1 % (0-3)   


 


Neutrophils # (Auto)


 


 4.0 x10^3/uL


(1.8-7.7) 


 





 


Lymphocytes # (Auto)


 


 1.6 x10^3/uL


(1.0-4.8) 


 





 


Monocytes # (Auto)


 


 0.9 x10^3/uL


(0.0-1.1) 


 





 


Eosinophils # (Auto)


 


 0.2 x10^3/uL


(0.0-0.7) 


 





 


Basophils # (Auto)


 


 0.1 x10^3/uL


(0.0-0.2) 


 





 


Sodium Level


 


 129 mmol/L


(136-145) 


 





 


Potassium Level


 


 3.6 mmol/L


(3.5-5.1) 


 





 


Chloride Level


 


 93 mmol/L


() 


 





 


Carbon Dioxide Level


 


 31 mmol/L


(21-32) 


 





 


Anion Gap  5 (6-14)   


 


Blood Urea Nitrogen


 


 14 mg/dL


(8-26) 


 





 


Creatinine


 


 0.7 mg/dL


(0.7-1.3) 


 





 


Estimated GFR


(Cockcroft-Gault) 


 113.5 


 


 





 


Glucose Level


 


 121 mg/dL


(70-99) 


 





 


Calcium Level


 


 8.5 mg/dL


(8.5-10.1) 


 





 


Phosphorus Level


 


 3.0 mg/dL


(2.6-4.7) 


 





 


Magnesium Level


 


 1.8 mg/dL


(1.8-2.4) 


 





 


Troponin I Quantitative


 


 < 0.017 ng/mL


(0.000-0.055) 


 





 


Ethyl Alcohol Level


 


 


 < 10 mg/dL


(0-10) 





 


Prothrombin Time


 


 


 


 13.2 SEC


(11.7-14.0)


 


Prothromb Time International


Ratio 


 


 


 1.0 (0.8-1.1) 





 


Activated Partial


Thromboplast Time 


 


 


 29 SEC (24-38) 





 


Test


 5/26/21


10:17 


 


 





 


Urine Opiates Screen Neg (NEG)    


 


Urine Methadone Screen Neg (NEG)    


 


Urine Barbiturates Neg (NEG)    


 


Urine Phencyclidine Screen Neg (NEG)    


 


Urine


Amphetamine/Methamphetamine Neg (NEG) 


 


 


 





 


Urine Benzodiazepines Screen Neg (NEG)    


 


Urine Cocaine Screen Neg (NEG)    


 


Urine Cannabinoids Screen Neg (NEG)    


 


Urine Ethyl Alcohol Neg (NEG)    











Images


Images


Head CT without contrast.





HISTORY: Stroke. Right upper extremity weakness.





TECHNIQUE: Computed tomographic images of the head were obtained without 

contrast.





*One or more of the following individualized dose reduction techniques were 

utilized for this examination:  


1. Automated exposure control.  


2. Adjustment of the mA and/or kV according to patient size.  


3. Use of iterative reconstruction technique.





COMPARISON: 12/2/2019.





FINDINGS: There is no acute or subacute extra-axial or intraparenchymal 

hemorrhage. There is no mass effect or midline shift. There is no hydrocephalus.







There are extensive areas of decreased attenuation within the cerebral white 

matter, nonspecific and likely related to chronic small vessel disease and small

chronic infarcts. There is cerebral volume loss.





There is evidence of lens surgery. The visualized paranasal sinuses mastoid air 

cells are unremarkable. There is no suspicious calvarial lesion.





IMPRESSION:


1. No acute intracranial finding. Note is made that MRI is more sensitive for 

acute infarction.


2. Extensive bilateral cerebral white matter changes, likely due to the 

combination of chronic small vessel disease and chronic infarcts.


3. Cerebral volume loss.





Assessment/Plan


Assessment/Plan


Impression:


Wake-up stroke, was not checked for large vessel occlusion, but no sign of that 

clinically, very minimal right sided weakness.


Chronic dysphagia from achalasia


Chronic gait disorder from back problems and arthritis


Being worked up for possible lung cancer.





Recommendations:


MRI of the brain


Echocardiogram


CT angiogram


Rehabilitation modalities


Increase aspirin to 3 to 25 mg daily


Check lipids


Discussed with patient, wife, daughter


Discussed with Dr. Mercado





Thank you for letting me help with the patient's care.











BIBI VALLADARES MD           May 26, 2021 14:36

## 2021-05-26 NOTE — RAD
EXAM: CT angiogram of the head and neck with intravenous contrast.



HISTORY: Stroke.



TECHNIQUE: Computed tomographic images the head and neck were obtained with intravenous contrast. 3-d
imensional images were obtained. 

*One or more of the following individualized dose reduction techniques were utilized for this examina
tion:  

1. Automated exposure control.  

2. Adjustment of the mA and/or kV according to patient size.  

3. Use of iterative reconstruction technique.



COMPARISON: Noncontrast head CT performed 5/26/2021, MRI dated 12/2/2019 and CT angiogram dated 12/2/
2019.



FINDINGS: The aortic arch is normal in caliber. There is a standard aortic arch branching pattern. Th
ere is atherosclerosis involving the arch great vessels, with less than 50 percent stenosis. There is
 also atherosclerosis involving the carotid bifurcations with less than 50 percent stenosis. There is
 mild segmental asymmetry in the caliber of the left greater than right A2 segments. The anterior com
municating artery is patent. There is a patent left posterior communicating artery. There is a promin
ent right posterior communicating artery with hypoplastic P1 segment or fetal right posterior cerebra
l artery, a normal variant. The basilar artery is widely patent.



There is occlusion of the proximal right vertebral artery. This reconstitutes within its mid aspect. 
There is minimal atherosclerosis involving the distal left vertebral artery the skull base, without s
ignificant stenosis. The left vertebral artery is dominant.



There is pulmonary emphysema. There is central bronchial wall thickening likely due to the sequela of
 bronchitis. There is groundglass and nodular right suprahilar opacity, possibly postinfectious or po
stinflammatory in etiology. There is no pneumothorax. There is no neck lymphadenopathy.



There is cerebral atrophy with compensatory enlargement of the ventricles. There are cerebral white m
atter changes, likely due to chronic small vessel disease. There is no suspicious enhancing lesion. T
here is no mass effect or midline shift. There is no hydrocephalus. There is evidence of lens surgery
. The paranasal sinuses are unremarkable. There is a small amount of mastoid fluid. There is degenera
tive change throughout the cervical spine. This results in foraminal stenosis at multiple levels.



IMPRESSION:



1. 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 This reconstitutes within it
s mid and distal aspects and is hypoplastic. Stable compared to the prior CT angiogram dated 12/2/201
9.

2. Mild atherosclerosis involving the carotid bifurcations and distal internal carotid arteries and d
istal left vertebral artery, with less than 50 percent stenosis.

3. No acute intracranial finding. Note is made that MRI is more sensitive for acute infarction.

4. Cerebral atrophy.

5. Cerebral white matter changes, likely due to chronic small vessel disease.

6. Pulmonary emphysema with central bronchial wall thickening and stable right suprahilar nodular and
 groundglass opacity, likely postinfectious or postinflammatory in etiology.







PQRS Compliance Statement - Stenosis calculations for CT, MR and conventional angiography are based u
fernandez measurement of the distal ICA diameter in accordance with the NASCET methodology.  Stenosis calcu
lations for carotid ultrasound studies are derived from validated velocity criteria which are known t
o correlate with the NASCET methodology.



Electronically signed by: Evelia Cheng MD (5/26/2021 3:56 PM) [id number]

## 2021-05-26 NOTE — ED.ADGEN
Past Medical History


Past Medical History:  Hypertension, Hypothyroid


Past Surgical History:  Other


Additional Past Surgical Histo:  AORTIC ANEURSYM REPAIR,BACK,HAND


Smoking Status:  Never Smoker


Alcohol Use:  None


Drug Use:  None





General Adult


HPI:


HPI:





Patient is a 65-year-old male who arrives via private vehicle to the emergency 

department complaining of right upper extremity numbness.  Patient reports he 

awoke with the symptoms and continues to have them.  Patient states she has 

tingling of his right upper extremity however he does have full range of motion.

 Patient was instructed by family to seek out evaluation in the emergency 

department for possible stroke.  Patient denies any dizziness or lighthea

dedness.  He further denies pain of any kind and states he has not had any 

recent illness.  He denies any neurological changes otherwise and states he went

to bed feeling normal.  He is awake, alert and nontoxic-appearing.





Review of Systems:


Review of Systems:


Constitutional:   Denies fever or chills. []


Eyes:   Denies change in visual acuity. []


HENT:   Denies nasal congestion or sore throat. [] 


Respiratory:   Denies cough or shortness of breath. [] 


Cardiovascular:   Denies chest pain or edema. [] 


GI:   Denies abdominal pain, nausea, vomiting, bloody stools or diarrhea. [] 


:  Denies dysuria. [] 


Musculoskeletal:   Denies back pain or joint pain. [] 


Integument:   Denies rash. [] 


Neurologic:   Reports tingling and numbness of right upper extremity.  Denies 

headache, focal weakness. [] 


Endocrine:   Denies polyuria or polydipsia. [] 


Lymphatic:  Denies swollen glands. [] 


Psychiatric:  Denies depression or anxiety. []





Allergies:


Allergies:





Allergies








Coded Allergies Type Severity Reaction Last Updated Verified


 


  No Known Drug Allergies    16 No











Physical Exam:


PE:





Constitutional: Well developed, well nourished, no acute distress, non-toxic 

appearance. []


HENT: Normocephalic, atraumatic, bilateral external ears normal, oropharynx 

moist, no oral exudates, nose normal. []


Eyes: PERRLA, EOMI, conjunctiva normal, no discharge. [] 


Neck: Normal range of motion, no tenderness, supple, no stridor. [] 


Cardiovascular:Heart rate regular rhythm, no murmur []


Lungs & Thorax:  Bilateral breath sounds clear to auscultation []


Abdomen: Bowel sounds normal, soft, no tenderness, no masses, no pulsatile 

masses. [] 


Skin: Warm, dry, no erythema, no rash. [] 


Back: No tenderness, no CVA tenderness. [] 


Extremities: No tenderness, no cyanosis, no clubbing, ROM intact, no edema. [] 


Neurologic: Alert and oriented X 3, normal motor function, normal sensory f

unction, no focal deficits noted. []


Psychologic: Affect normal, judgement normal, mood normal. []





Current Patient Data:


Labs:





                                Laboratory Tests








Test


 21


08:43 21


08:50


 


Glucose (Fingerstick)


 125 mg/dL


(70-99)  H 





 


White Blood Count


 


 6.7 x10^3/uL


(4.0-11.0)


 


Red Blood Count


 


 4.46 x10^6/uL


(4.30-5.70)


 


Hemoglobin


 


 13.1 g/dL


(13.0-17.5)


 


Hematocrit


 


 37.8 %


(39.0-53.0)  L


 


Mean Corpuscular Volume


 


 85 fL ()





 


Mean Corpuscular Hemoglobin  29 pg (25-35)  


 


Mean Corpuscular Hemoglobin


Concent 


 35 g/dL


(31-37)


 


Red Cell Distribution Width


 


 13.2 %


(11.5-14.5)


 


Platelet Count


 


 203 x10^3/uL


(140-400)


 


Neutrophils (%) (Auto)  59 % (31-73)  


 


Lymphocytes (%) (Auto)  24 % (24-48)  


 


Monocytes (%) (Auto)  14 % (0-9)  H


 


Eosinophils (%) (Auto)  3 % (0-3)  


 


Basophils (%) (Auto)  1 % (0-3)  


 


Neutrophils # (Auto)


 


 4.0 x10^3/uL


(1.8-7.7)


 


Lymphocytes # (Auto)


 


 1.6 x10^3/uL


(1.0-4.8)


 


Monocytes # (Auto)


 


 0.9 x10^3/uL


(0.0-1.1)


 


Eosinophils # (Auto)


 


 0.2 x10^3/uL


(0.0-0.7)


 


Basophils # (Auto)


 


 0.1 x10^3/uL


(0.0-0.2)


 


Sodium Level


 


 129 mmol/L


(136-145)  L


 


Potassium Level


 


 3.6 mmol/L


(3.5-5.1)


 


Chloride Level


 


 93 mmol/L


()  L


 


Carbon Dioxide Level


 


 31 mmol/L


(21-32)


 


Anion Gap  5 (6-14)  L


 


Blood Urea Nitrogen


 


 14 mg/dL


(8-26)


 


Creatinine


 


 0.7 mg/dL


(0.7-1.3)


 


Estimated GFR


(Cockcroft-Gault) 


 113.5  





 


Glucose Level


 


 121 mg/dL


(70-99)  H


 


Calcium Level


 


 8.5 mg/dL


(8.5-10.1)


 


Troponin I Quantitative


 


 < 0.017 ng/mL


(0.000-0.055)





                                Laboratory Tests


21 08:50








                                Laboratory Tests


21 08:50








Vital Signs:





                                   Vital Signs








  Date Time  Temp Pulse Resp B/P (MAP) Pulse Ox O2 Delivery O2 Flow Rate FiO2


 


21 09:28  87 16 157/80 (105) 96 Room Air  


 


21 08:40 97.7       





 97.7       











EKG:


EKG:


EKG was obtained at 8:46 AM revealed normal sinus rhythm with a ventricular rate

 of 69 bpm.  There are premature atrial complexes present with left axis deviat

ion as well as an incomplete right bundle branch block.  Left ventricular 

hypertrophy is present as well.  There are no acute ST/T wave changes to denote 

ischemia []





Heart Score:


C/O Chest Pain:  No


Risk Factors:


Risk Factors:  DM, Current or recent (<one month) smoker, HTN, HLP, family 

history of CAD, obesity.


Risk Scores:


Score 0 - 3:  2.5% MACE over next 6 weeks - Discharge Home


Score 4 - 6:  20.3% MACE over next 6 weeks - Admit for Clinical Observation


Score 7 - 10:  72.7% MACE over next 6 weeks - Early Invasive Strategies





Radiology/Procedures:


Radiology/Procedures:


[]


Impression:


[facility]


                    [address]  [place], [place] [id number] [phone]


                                        


                                 IMAGING REPORT





                                     Signed





PATIENT: BONIFACIO CLARK  ACCOUNT: [id number]     MRN#: [id number]


: 1955           LOCATION: ER              AGE: 65


SEX: M                    EXAM DT: 21         ACCESSION#: [id number]


STATUS: PRE ER            ORD. PHYSICIAN: NEREYDA CARRILLO DO


REASON: Right upper extremity numbness


PROCEDURE: CT CODE STROKE HEAD WO











EXAM: Head CT without contrast.





HISTORY: Stroke. Right upper extremity weakness.





TECHNIQUE: Computed tomographic images of the head were obtained without 

contrast.





*One or more of the following individualized dose reduction techniques were 

utilized for this examination:  


1. Automated exposure control.  


2. Adjustment of the mA and/or kV according to patient size.  


3. Use of iterative reconstruction technique.





COMPARISON: 2019.





FINDINGS: There is no acute or subacute extra-axial or intraparenchymal 

hemorrhage. There is no mass effect or midline shift. There is no hydrocephalus.

 





There are extensive areas of decreased attenuation within the cerebral white 

matter, nonspecific and likely related to chronic small vessel disease and small

 chronic infarcts. There is cerebral volume loss.





There is evidence of lens surgery. The visualized paranasal sinuses mastoid air 

cells are unremarkable. There is no suspicious calvarial lesion.





IMPRESSION:


1. No acute intracranial finding. Note is made that MRI is more sensitive for 

acute infarction.


2. Extensive bilateral cerebral white matter changes, likely due to the 

combination of chronic small vessel disease and chronic infarcts.


3. Cerebral volume loss.





Findings were discussed with Dr. Carrillo in the ED at 0900 hours on 2021.





**********FOR INTERNAL CODING PURPOSES**********








RESULT CODE: (FUP)  











Electronically signed by: Evelia Garcia MD (2021 9:05 AM) [id number]














DICTATED and SIGNED BY:     EVELIA GARCIA MD


DATE:     21 [id number] 0








Course & Med Decision Making:


Course & Med Decision Making


Pertinent Labs and Imaging studies reviewed. (See chart for details)





The patient remains awake, alert and in no acute distress.  Patient does not 

meet criteria for thrombolytic intervention.  Nonetheless I do believe the 

patient would benefit from admission for further study of a possible 

neurological insult and subsequently he has been admitted to the hospital 

service.  The patient understands this and has agreed to stay.  At this time he 

is resting comfortably does not demonstrate any acute neurological injury.  He 

is awake, alert and nontoxic-appearing.  He is awaiting transport to the floor.





Dragon Disclaimer:


Dragon Disclaimer:


This electronic medical record was generated, in whole or in part, using a voice

 recognition dictation system.





Departure


Departure


Impression:  


   Primary Impression:  


   TIA (transient ischemic attack)


   Additional Impression:  


   Hyponatremia


Disposition:   ADMITTED AS INPATIENT


Admitting Physician:  HIMS


Condition:  STABLE


Referrals:  


UNKNOWN PCP NAME (PCP)





Problem Qualifiers











NEREYDA CARRILLO DO               May 26, 2021 08:43

## 2021-05-26 NOTE — PDOC1
History and Physical


Date of Admission


Date of Admission


DATE: 5/26/21 


TIME: 10:02





Identification/Chief Complaint


Chief Complaint


Weakness, numbness, gait instability





Source


Source:  Patient





History of Present Illness


History of Present Illness


Mr Hays is a 65-year-old male w/ PMHx HTN, hypothyroidism, CAD s/p CABG, GERD,

achalasia who arrives via private vehicle to the emergency department 

complaining of right upper extremity numbness, weakness and bilateral leg 

numbness.  Patient reports he awoke with the symptoms and continues to have 

them. He did not have these symptoms prior to going to bed on 5/25/2021. He went

to a PET scan at [facility] for concern for lung nodules and noted this was a priority

to him. Patient was instructed by family to seek out evaluation in the emergency

department for possible stroke.  Patient denies any dizziness or 

lightheadedness.


CT head with no acute abnormality


EKG NSR rate of 69bpm, meets criteria for LVH


Labs with WBC 6.7, Hb 13.1, platelets 203, Na 129, K 3.6, CR 0.7, glucose 121, 

troponin negative


Admitted for further care.





Past Medical History


Cardiovascular:  HTN


Pulmonary:  No pertinent hx


CENTRAL NERVOUS SYSTEM:  Other


GI:  No pertinent hx


Heme/Onc:  No pertinent hx


Hepatobiliary:  No pertinent hx


Psych:  No pertinent hx


Musculoskeletal:  Osteoarthritis, Other


Rheumatologic:  No pertinent hx


Infectious disease:  No pertinent hx


Renal/:  No pertinent hx


Endocrine:  Hypothyroidism





Past Surgical History


Past Surgical History:  CABG, Other





Family History


Family History:  Diabetes, Heart Disease





Social History


Smoke:  No


ALCOHOL:  none


Drugs:  None





Current Medications


Current Medications





Active Scripts


Active


Hydrochlorothiazide Tablet (Hydrochlorothiazide) 12.5 Mg Tablet 1 Tab PO DAILY


Lisinopril 40 Mg Tablet 40 Mg PO DAILY


Reported


Meclizine Hcl 12.5 Mg Tablet 1 Tab PO TID


Protonix (Pantoprazole Sodium) 20 Mg Tablet.dr 40 Mg PO BID


Levothyroxine Sodium 88 Mcg Tablet 88 Mcg PO DAILYAC





Allergies


Allergies:  


Coded Allergies:  


     No Known Drug Allergies (Unverified , 4/28/16)





ROS


General:  YES: Fatigue, Malaise; 


   No: Chills, Night Sweats, Appetite, Other


PSYCHOLOGICAL ROS:  No: Anxiety, Behavioral Disorder, Concentration difficultie,

Decreased libido, Depression, Disorientation, Hallucinations, Hostility, 

Irritablity, Memory difficulties, Mood Swings, Obsessive thoughts, Physical 

abuse, Sexual abuse, Sleep disturbances, Suicidal ideation, Other


Eyes:  No Blurry vision, No Decreased vision, No Double vision, No Dry eyes, No 

Excessive tearing, No Eye Pain, No Itchy Eyes, No Loss of vision, No 

Photophobia, No Scotomata, No Uses contacts, No Uses glasses, No Other


HEENT:  No: Heacaches, Visual Changes, Hearing change, Nasal congestion, Nasal 

discharge, Oral lesions, Sinus pain, Sore Throat, Epistaxis, Sneezing, Snoring, 

Tinnitus, Vertigo, Vocal changes, Other


ALLERGY AND IMMUNOLOGY:  No: Hives, Insect Bite Sensitivity, Itchy/Watery Eyes, 

Nasal Congestion, Post Nasal Drip, Seasonal Allergies, Other


Hematological and Lymphatic:  No: Bleeding Problems, Blood Clots, Blood 

Transfusions, Brusing, Night Sweats, Pallor, Swollen Lymph Nodes, Other


ENDOCRINE:  No: Breast Changes, Galactorrhea, Hair Pattern Changes, Hot Flashes,

Malaise/lethargy, Mood Swings, Palpitations, Polydipsia/polyuria, Skin Changes, 

Temperature Intolerance, Unexpected Weight Changes, Other


Breast:  No New/Changing Breast Lumps, No Nipple changes, No Nipple discharge, 

No Other


Respiratory:  No: Cough, Hemoptysis, Orthopnea, Pleuritic Pain, Shortness of 

breath, SOB with excertion, Sputum Changes, Stridor, Tachypnea, Wheezing, Other


Cardiovascular:  No Chest Pain, No Palpitations, No Orthopnea, No Paroxysmal 

Noc. Dyspnea, No Edema, No Lt Headedness, No Other


Gastrointestinal:  No Nausea, No Vomiting, No Abdominal Pain, No Diarrhea, No 

Constipation, No Melena, No Hematochezia, No Other


Genitourinary:  No Dysuria, No Frequency, No Incontinence, No Hematuria, No 

Retention, No Discharge, No Urgency, No Pain, No Flank Pain, No Other, No , No ,

No , No , No , No , No 


Musculoskeletal:  Yes Gait Disturbance; 


   No Joint Pain, No Joint Stiffness, No Joint Swelling, No Muscle Pain, No 

Muscular Weakness, No Pain In:, No Swelling In:, No Other


Neurological:  Yes Gait Disturbance, Yes Numbness/Tingling, Yes Speech Problems,

Yes Weakness; 


   No Behavorial Changes, No Bowel/Bladder ControlChng, No Confusion, No Dizzin

ess, No Headaches, No Impaired Coord/balance, No Memory Loss, No Seizures, No 

Tremors, No Visual Changes, No Other


Skin:  No Dry Skin, No Eczema, No Hair Changes, No Lumps, No Mole Changes, No 

Mottling, No Nail Changes, No Pruritus, No Rash, No Skin Lesion Changes, No 

Other, No Acne





Physical Exam


General:  Alert, Oriented X3, Cooperative, No acute distress


HEENT:  Atraumatic, PERRLA, EOMI, Mucous membr. moist/pink


Lungs:  Other (Scattered wheezes bilaterally)


Heart:  S1S2, RRR, no thrills, no rubs, no gallops, no murmurs


Abdomen:  Normal bowel sounds, Soft, No tenderness, No hepatosplenomegaly, No 

masses


Rectal Exam:  not examined


Extremities:  No clubbing, No cyanosis, No edema, Normal pulses, No tendernes

s/swelling


Skin:  No rashes, No breakdown, No significant lesion


Neuro:  Normal speech, Strength at 5/5 X4 ext, Normal tone, Cranial nerves 3-12 

NL, Reflexes 2+, Other (Decreased right arm and bilateral feet sensation)


Psych/Mental Status:  Mental status NL, Mood NL





Vitals


Vitals





Vital Signs








  Date Time  Temp Pulse Resp B/P (MAP) Pulse Ox O2 Delivery O2 Flow Rate FiO2


 


5/26/21 09:28  87 16 157/80 (105) 96 Room Air  


 


5/26/21 08:40 97.7       





 97.7       











Labs


Labs





Laboratory Tests








Test


 5/26/21


08:43 5/26/21


08:50


 


Glucose (Fingerstick)


 125 mg/dL


(70-99) 





 


White Blood Count


 


 6.7 x10^3/uL


(4.0-11.0)


 


Red Blood Count


 


 4.46 x10^6/uL


(4.30-5.70)


 


Hemoglobin


 


 13.1 g/dL


(13.0-17.5)


 


Hematocrit


 


 37.8 %


(39.0-53.0)


 


Mean Corpuscular Volume  85 fL () 


 


Mean Corpuscular Hemoglobin  29 pg (25-35) 


 


Mean Corpuscular Hemoglobin


Concent 


 35 g/dL


(31-37)


 


Red Cell Distribution Width


 


 13.2 %


(11.5-14.5)


 


Platelet Count


 


 203 x10^3/uL


(140-400)


 


Neutrophils (%) (Auto)  59 % (31-73) 


 


Lymphocytes (%) (Auto)  24 % (24-48) 


 


Monocytes (%) (Auto)  14 % (0-9) 


 


Eosinophils (%) (Auto)  3 % (0-3) 


 


Basophils (%) (Auto)  1 % (0-3) 


 


Neutrophils # (Auto)


 


 4.0 x10^3/uL


(1.8-7.7)


 


Lymphocytes # (Auto)


 


 1.6 x10^3/uL


(1.0-4.8)


 


Monocytes # (Auto)


 


 0.9 x10^3/uL


(0.0-1.1)


 


Eosinophils # (Auto)


 


 0.2 x10^3/uL


(0.0-0.7)


 


Basophils # (Auto)


 


 0.1 x10^3/uL


(0.0-0.2)


 


Sodium Level


 


 129 mmol/L


(136-145)


 


Potassium Level


 


 3.6 mmol/L


(3.5-5.1)


 


Chloride Level


 


 93 mmol/L


()


 


Carbon Dioxide Level


 


 31 mmol/L


(21-32)


 


Anion Gap  5 (6-14) 


 


Blood Urea Nitrogen


 


 14 mg/dL


(8-26)


 


Creatinine


 


 0.7 mg/dL


(0.7-1.3)


 


Estimated GFR


(Cockcroft-Gault) 


 113.5 





 


Glucose Level


 


 121 mg/dL


(70-99)


 


Calcium Level


 


 8.5 mg/dL


(8.5-10.1)


 


Troponin I Quantitative


 


 < 0.017 ng/mL


(0.000-0.055)








Laboratory Tests








Test


 5/26/21


08:43 5/26/21


08:50


 


Glucose (Fingerstick)


 125 mg/dL


(70-99) 





 


White Blood Count


 


 6.7 x10^3/uL


(4.0-11.0)


 


Red Blood Count


 


 4.46 x10^6/uL


(4.30-5.70)


 


Hemoglobin


 


 13.1 g/dL


(13.0-17.5)


 


Hematocrit


 


 37.8 %


(39.0-53.0)


 


Mean Corpuscular Volume  85 fL () 


 


Mean Corpuscular Hemoglobin  29 pg (25-35) 


 


Mean Corpuscular Hemoglobin


Concent 


 35 g/dL


(31-37)


 


Red Cell Distribution Width


 


 13.2 %


(11.5-14.5)


 


Platelet Count


 


 203 x10^3/uL


(140-400)


 


Neutrophils (%) (Auto)  59 % (31-73) 


 


Lymphocytes (%) (Auto)  24 % (24-48) 


 


Monocytes (%) (Auto)  14 % (0-9) 


 


Eosinophils (%) (Auto)  3 % (0-3) 


 


Basophils (%) (Auto)  1 % (0-3) 


 


Neutrophils # (Auto)


 


 4.0 x10^3/uL


(1.8-7.7)


 


Lymphocytes # (Auto)


 


 1.6 x10^3/uL


(1.0-4.8)


 


Monocytes # (Auto)


 


 0.9 x10^3/uL


(0.0-1.1)


 


Eosinophils # (Auto)


 


 0.2 x10^3/uL


(0.0-0.7)


 


Basophils # (Auto)


 


 0.1 x10^3/uL


(0.0-0.2)


 


Sodium Level


 


 129 mmol/L


(136-145)


 


Potassium Level


 


 3.6 mmol/L


(3.5-5.1)


 


Chloride Level


 


 93 mmol/L


()


 


Carbon Dioxide Level


 


 31 mmol/L


(21-32)


 


Anion Gap  5 (6-14) 


 


Blood Urea Nitrogen


 


 14 mg/dL


(8-26)


 


Creatinine


 


 0.7 mg/dL


(0.7-1.3)


 


Estimated GFR


(Cockcroft-Gault) 


 113.5 





 


Glucose Level


 


 121 mg/dL


(70-99)


 


Calcium Level


 


 8.5 mg/dL


(8.5-10.1)


 


Troponin I Quantitative


 


 < 0.017 ng/mL


(0.000-0.055)











Images


Images


CT head non-contrast:


There is no acute or subacute extra-axial or intraparenchymal hemorrhage. There 

is no mass effect or midline shift. There is no hydrocephalus. 


There are extensive areas of decreased attenuation within the cerebral white 

matter, nonspecific and likely related to chronic small vessel disease and small

chronic infarcts. There is cerebral volume loss.


There is evidence of lens surgery. The visualized paranasal sinuses mastoid air 

cells are unremarkable. There is no suspicious calvarial lesion.





IMPRESSION:


1. No acute intracranial finding. Note is made that MRI is more sensitive for 

acute infarction.


2. Extensive bilateral cerebral white matter changes, likely due to the 

combination of chronic small vessel disease and chronic infarcts.


3. Cerebral volume loss.





VTE Prophylaxis Ordered


VTE Prophylaxis Devices:  Contraindicated


VTE Pharmacological Prophylaxi:  Yes





Assessment/Plan


Assessment/Plan


A/P:


Right sided numbness and weakness - TIA symptoms vs electrolyte derangement. 

Consulted neurology given persistent symptoms. CT angiogram and MRI indicated. 

outside tPA window, will given full dose ASA and statin. PT/OT/SLP evaluation


Hyponatremia - likely symptomatic and acute, will correct with normal saline. 

Likely hypovolemic, no sign of CHF. given his PET scan today, also concerning 

for possible SCLC paraneoplastic phenomenon vs SIADH. Will trend. Check serum 

and urine osm


Dysphagia - long standing with multiple prior surgeries.


HTN - cont coreg, losartan


Hyperlipidemia - statin


Hypothyroidism - cont levothyroxine, check TSH


CAD s/p CABG - cont meds


Abnormel chest CT - will f/u PET scan results





FEN - NSS, NPO pending swallow evaluation


PPX - heparin


FULL CODE


Dispo - inpatient





Justifications for Admission


Other Justification














WALKER KANG MD        May 26, 2021 10:08

## 2021-05-27 VITALS — SYSTOLIC BLOOD PRESSURE: 155 MMHG | DIASTOLIC BLOOD PRESSURE: 89 MMHG

## 2021-05-27 VITALS — DIASTOLIC BLOOD PRESSURE: 66 MMHG | SYSTOLIC BLOOD PRESSURE: 129 MMHG

## 2021-05-27 VITALS — SYSTOLIC BLOOD PRESSURE: 129 MMHG | DIASTOLIC BLOOD PRESSURE: 66 MMHG

## 2021-05-27 VITALS — DIASTOLIC BLOOD PRESSURE: 72 MMHG | SYSTOLIC BLOOD PRESSURE: 136 MMHG

## 2021-05-27 LAB
ANION GAP SERPL CALC-SCNC: 6 MMOL/L (ref 6–14)
BUN SERPL-MCNC: 12 MG/DL (ref 8–26)
CALCIUM SERPL-MCNC: 8.3 MG/DL (ref 8.5–10.1)
CHLORIDE SERPL-SCNC: 96 MMOL/L (ref 98–107)
CHOLEST SERPL-MCNC: 83 MG/DL (ref 0–200)
CHOLEST/HDLC SERPL: 2.4 {RATIO}
CO2 SERPL-SCNC: 30 MMOL/L (ref 21–32)
CREAT SERPL-MCNC: 0.6 MG/DL (ref 0.7–1.3)
GFR SERPLBLD BASED ON 1.73 SQ M-ARVRAT: 135.6 ML/MIN
GLUCOSE SERPL-MCNC: 99 MG/DL (ref 70–99)
HDLC SERPL-MCNC: 34 MG/DL (ref 40–60)
LDLC: 32 MG/DL (ref 0–100)
POTASSIUM SERPL-SCNC: 3.4 MMOL/L (ref 3.5–5.1)
SODIUM SERPL-SCNC: 132 MMOL/L (ref 136–145)
TRIGL SERPL-MCNC: 85 MG/DL (ref 0–150)
VLDLC: 17 MG/DL (ref 0–40)

## 2021-05-27 RX ADMIN — BACITRACIN SCH MLS/HR: 5000 INJECTION, POWDER, FOR SOLUTION INTRAMUSCULAR at 02:28

## 2021-05-27 RX ADMIN — PANTOPRAZOLE SODIUM SCH MG: 40 TABLET, DELAYED RELEASE ORAL at 08:01

## 2021-05-27 RX ADMIN — CARVEDILOL SCH MG: 6.25 TABLET, FILM COATED ORAL at 17:04

## 2021-05-27 RX ADMIN — BACITRACIN SCH MLS/HR: 5000 INJECTION, POWDER, FOR SOLUTION INTRAMUSCULAR at 17:04

## 2021-05-27 RX ADMIN — PANTOPRAZOLE SODIUM SCH MG: 40 TABLET, DELAYED RELEASE ORAL at 17:02

## 2021-05-27 RX ADMIN — CARVEDILOL SCH MG: 6.25 TABLET, FILM COATED ORAL at 08:02

## 2021-05-27 NOTE — CARD
MR#: [id number]

Account#: [id number]

Accession#: [id number]

Date of Study: 05/27/2021

Ordering Physician: BIBI VALLADARES, 

Referring Physician: BIBI VALLADARES, 

Tech: Ree Dallupeeliseo, [facility]





--------------- APPROVED REPORT --------------





EXAM: Two-dimensional and M-mode echocardiogram with Doppler and color Doppler.



Other Information 

Quality : FairHR: 69bpm

Technically limited study due to  body habitus.



INDICATION

CVA/TIA 



Echo Enhancing Agent

Indication: Rule Out Septal Defect

Agent/Amount Used: Agitated Saline 10mL



RISK FACTORS

Hypertension 

Hyperlipidemia



2D DIMENSIONS 

Left Atrium(2D)3.4 (1.6-4.0cm)IVSd1.3 (0.7-1.1cm)

Aortic Root(2D)3.3 (2.0-3.7cm)LVDd6.1 (3.9-5.9cm)

LVOT Diameter2.0 (1.8-2.4cm)PWd1.3 (0.7-1.1cm)

LVDs3.5 (2.5-4.0cm)FS (%) 43.1 %

.4 mlLVEF(%)63.3 (>50%)



Aortic Valve

AoV Peak James.145.2cm/sAoV VTI28.8cm

AO Peak GR.8.4mmHgLVOT Peak James.131.8cm/s

LVOT  VTI 27.92cmAO Mean GR.4mmHg

JIGNESH (VMAX)2.78jz7PFT   (VTI)3.13cm2

AI P 1/2 Dimu302wm



Mitral Valve

MV E Ejfrmuuq00.6cm/sMV DECEL JNNK026gu

MV A Grnqvwbv11.0cm/sMV IIR41ms

E/A  Ratio0.8MVA (PHT)3.41cm2



TDI

E/Lateral E'7.4E/Medial E'13.1



Tricuspid Valve

TR P. Cajnskug296be/sTR Peak Gr.24mmHg



 LEFT VENTRICLE 

The Left Ventricle is mildly dilated. There is mild to moderate concentric left ventricular hypertrop
hy. The left ventricular systolic function is grossly normal and the ejection fraction is within norm
al range. EF 50-55% Grossly normal wall motion. Technically limited images. Transmitral Doppler flow 
pattern is Grade I-abnormal relaxation pattern.



 RIGHT VENTRICLE 

The right ventricle is normal size. There is normal right ventricular wall thickness. The right ventr
icular systolic function is normal.



 ATRIA 

The left atrium size is normal. The right atrium is borderline dilated. The interatrial septum is int
act with no evidence for an atrial septal defect or patent foramen ovale as noted on 2-D or Doppler i
maging.



 AORTIC VALVE 

Not well visualized. Doppler and Color Flow revealed trace aortic regurgitation. There is no signific
ant aortic valvular stenosis. Calculated aortic valve area is 2.47 cm2 with maximum pressure gradient
 of 11 mmHg and mean pressure gradient of 5 mmHg.



 MITRAL VALVE 

Not well visualized. There is no evidence of mitral valve prolapse. There is no mitral valve stenosis
. Doppler and Color-flow revealed trace mitral regurgitation.



 TRICUSPID VALVE 

Not well visualized. Doppler and Color Flow revealed trace tricuspid regurgitation with an estimated 
PAP of 30 mmHg. There is no tricuspid valve stenosis. 



 PULMONIC VALVE 

The pulmonic valve is not well visualized.



 GREAT VESSELS 

The aortic root is normal in size. The ascending aorta is normal in size. The IVC is normal in size a
nd collapses >50% with inspiration.



 PERICARDIAL EFFUSION 

There is no evidence of significant pericardial effusion.



Critical Notification

Critical Value: No



<Conclusion>

The left ventricular systolic function is grossly normal and the ejection fraction is within normal r
coleman. EF 50-55%

Grossly normal wall motion. Technically limited images.



Signed by : Adi Mathur, 

Electronically Approved : 05/27/2021 18:33:38

## 2021-05-27 NOTE — PDOC3
Discharge Summary


Visit Information


Date of Admission:  May 26, 2021


Date of Discharge:  May 27, 2021


Admitting Diagnosis:  Acute CVA


Final Diagnosis


Problems


Medical Problems:


(1) Hyponatremia


Status: Acute  





(2) TIA (transient ischemic attack)


Status: Acute  











Brief Hospital Course


Allergies





                                    Allergies








Coded Allergies Type Severity Reaction Last Updated Verified


 


  No Known Drug Allergies    4/28/16 No








Vital Signs





Vital Signs








  Date Time  Temp Pulse Resp B/P (MAP) Pulse Ox O2 Delivery O2 Flow Rate FiO2


 


5/27/21 08:02  63  136/72    


 


5/27/21 07:00 97.6  16  98 Room Air  





 97.6       








Lab Results





Laboratory Tests








Test


 5/26/21


08:43 5/26/21


08:50 5/26/21


09:50 5/26/21


09:54


 


Glucose (Fingerstick)


 125 mg/dL


(70-99) 


 


 





 


White Blood Count


 


 6.7 x10^3/uL


(4.0-11.0) 


 





 


Red Blood Count


 


 4.46 x10^6/uL


(4.30-5.70) 


 





 


Hemoglobin


 


 13.1 g/dL


(13.0-17.5) 


 





 


Hematocrit


 


 37.8 %


(39.0-53.0) 


 





 


Mean Corpuscular Volume  85 fL ()   


 


Mean Corpuscular Hemoglobin  29 pg (25-35)   


 


Mean Corpuscular Hemoglobin


Concent 


 35 g/dL


(31-37) 


 





 


Red Cell Distribution Width


 


 13.2 %


(11.5-14.5) 


 





 


Platelet Count


 


 203 x10^3/uL


(140-400) 


 





 


Neutrophils (%) (Auto)  59 % (31-73)   


 


Lymphocytes (%) (Auto)  24 % (24-48)   


 


Monocytes (%) (Auto)  14 % (0-9)   


 


Eosinophils (%) (Auto)  3 % (0-3)   


 


Basophils (%) (Auto)  1 % (0-3)   


 


Neutrophils # (Auto)


 


 4.0 x10^3/uL


(1.8-7.7) 


 





 


Lymphocytes # (Auto)


 


 1.6 x10^3/uL


(1.0-4.8) 


 





 


Monocytes # (Auto)


 


 0.9 x10^3/uL


(0.0-1.1) 


 





 


Eosinophils # (Auto)


 


 0.2 x10^3/uL


(0.0-0.7) 


 





 


Basophils # (Auto)


 


 0.1 x10^3/uL


(0.0-0.2) 


 





 


Sodium Level


 


 129 mmol/L


(136-145) 


 





 


Potassium Level


 


 3.6 mmol/L


(3.5-5.1) 


 





 


Chloride Level


 


 93 mmol/L


() 


 





 


Carbon Dioxide Level


 


 31 mmol/L


(21-32) 


 





 


Anion Gap  5 (6-14)   


 


Blood Urea Nitrogen


 


 14 mg/dL


(8-26) 


 





 


Creatinine


 


 0.7 mg/dL


(0.7-1.3) 


 





 


Estimated GFR


(Cockcroft-Gault) 


 113.5 


 


 





 


Glucose Level


 


 121 mg/dL


(70-99) 


 





 


Plasma/Serum Osmolality


 


 272 mOsmol/kg


(280-301) 


 





 


Calcium Level


 


 8.5 mg/dL


(8.5-10.1) 


 





 


Phosphorus Level


 


 3.0 mg/dL


(2.6-4.7) 


 





 


Magnesium Level


 


 1.8 mg/dL


(1.8-2.4) 


 





 


Troponin I Quantitative


 


 < 0.017 ng/mL


(0.000-0.055) 


 





 


Thyroid Stimulating Hormone


(TSH) 


 3.676 uIU/mL


(0.358-3.74) 


 





 


Ethyl Alcohol Level


 


 


 < 10 mg/dL


(0-10) 





 


Prothrombin Time


 


 


 


 13.2 SEC


(11.7-14.0)


 


Prothromb Time International


Ratio 


 


 


 1.0 (0.8-1.1) 





 


Activated Partial


Thromboplast Time 


 


 


 29 SEC (24-38) 





 


Test


 5/26/21


10:17 5/26/21


17:03 5/27/21


07:00 





 


Urine Osmolality


 455 mOsmol/kg


(.) 


 


 





 


Urine Opiates Screen Neg (NEG)    


 


Urine Methadone Screen Neg (NEG)    


 


Urine Barbiturates Neg (NEG)    


 


Urine Phencyclidine Screen Neg (NEG)    


 


Urine


Amphetamine/Methamphetamine Neg (NEG) 


 


 


 





 


Urine Benzodiazepines Screen Neg (NEG)    


 


Urine Cocaine Screen Neg (NEG)    


 


Urine Cannabinoids Screen Neg (NEG)    


 


Urine Ethyl Alcohol Neg (NEG)    


 


Vitamin B12 Level


 


 348 pg/mL


(247-911) 


 





 


Sodium Level


 


 


 132 mmol/L


(136-145) 





 


Potassium Level


 


 


 3.4 mmol/L


(3.5-5.1) 





 


Chloride Level


 


 


 96 mmol/L


() 





 


Carbon Dioxide Level


 


 


 30 mmol/L


(21-32) 





 


Anion Gap   6 (6-14)  


 


Blood Urea Nitrogen


 


 


 12 mg/dL


(8-26) 





 


Creatinine


 


 


 0.6 mg/dL


(0.7-1.3) 





 


Estimated GFR


(Cockcroft-Gault) 


 


 135.6 


 





 


Glucose Level


 


 


 99 mg/dL


(70-99) 





 


Calcium Level


 


 


 8.3 mg/dL


(8.5-10.1) 





 


Triglycerides Level


 


 


 85 mg/dL


(0-150) 





 


Cholesterol Level


 


 


 83 mg/dL


(0-200) 





 


LDL Cholesterol, Calculated


 


 


 32 mg/dL


(0-100) 





 


VLDL Cholesterol, Calculated


 


 


 17 mg/dL


(0-40) 





 


Non-HDL Cholesterol Calculated


 


 


 49 mg/dL


(0-129) 





 


HDL Cholesterol


 


 


 34 mg/dL


(40-60) 





 


Cholesterol/HDL Ratio   2.4  








Laboratory Tests








Test


 5/26/21


17:03 5/27/21


07:00


 


Vitamin B12 Level


 348 pg/mL


(247-911) 





 


Sodium Level


 


 132 mmol/L


(136-145)


 


Potassium Level


 


 3.4 mmol/L


(3.5-5.1)


 


Chloride Level


 


 96 mmol/L


()


 


Carbon Dioxide Level


 


 30 mmol/L


(21-32)


 


Anion Gap  6 (6-14) 


 


Blood Urea Nitrogen


 


 12 mg/dL


(8-26)


 


Creatinine


 


 0.6 mg/dL


(0.7-1.3)


 


Estimated GFR


(Cockcroft-Gault) 


 135.6 





 


Glucose Level


 


 99 mg/dL


(70-99)


 


Calcium Level


 


 8.3 mg/dL


(8.5-10.1)


 


Triglycerides Level


 


 85 mg/dL


(0-150)


 


Cholesterol Level


 


 83 mg/dL


(0-200)


 


LDL Cholesterol, Calculated


 


 32 mg/dL


(0-100)


 


VLDL Cholesterol, Calculated


 


 17 mg/dL


(0-40)


 


Non-HDL Cholesterol Calculated


 


 49 mg/dL


(0-129)


 


HDL Cholesterol


 


 34 mg/dL


(40-60)


 


Cholesterol/HDL Ratio  2.4 








Brief Hospital Course


Mr Hays is a 65-year-old male w/ PMHx HTN, hypothyroidism, CAD s/p CABG, GERD,

achalasia who arrives via private vehicle to the emergency department 

complaining of right upper extremity numbness, weakness and bilateral leg 

numbness.  Patient reports he awoke with the symptoms and continues to have 

them. He did not have these symptoms prior to going to bed on 5/25/2021. He went

to a PET scan at [facility] for concern for lung nodules and noted this was a priority

to him. Patient was instructed by family to seek out evaluation in the emergency

department for possible stroke.  Patient denies any dizziness or 

lightheadedness.


CT head with no acute abnormality


EKG NSR rate of 69bpm, meets criteria for LVH


Labs with WBC 6.7, Hb 13.1, platelets 203, Na 129, K 3.6, CR 0.7, glucose 121, 

troponin negative


Admitted for further care.





MRI confirms acute CVA.  He is able to view his PET scan results that show a 

large mass in the right middle lobe of his lung.  His right-sided numbness is 

slightly improved.  Awaiting echocardiogram results prior to discharge.  I 

discussed with neurology.





Echocardiogram reviewed with cardiology, normal EF 50-55%, technically difficult

study.





Consults: Neurology





Problem list:


Acute CVA - with Right sided numbness and weakness - Focus of acute infarct in 

the left posterior frontal region., will given full dose ASA and statin (crestor

10mg). PT/OT/SLP evaluation completed.


Hyponatremia - likely symptomatic and acute, will correct with normal saline. 

Likely hypovolemic, no sign of CHF. given his PET scan today, also concerning 

for possible SCLC paraneoplastic phenomenon vs SIADH. Will trend. 


Dysphagia - long standing with multiple prior surgeries.


HTN - cont coreg, losartan


Hyperlipidemia - statin


Hypothyroidism - cont levothyroxine, check TSH


CAD s/p CABG - cont meds


Abnormal chest CT - f/u PET scan results show enlarged mass in right middle lobe

on his mychart. Advised to follow up urgently with [facility]





Greater than 30 minutes spent on d/c





Discharge Information


Condition at Discharge:  Stable


Follow Up:  Weeks


Disposition/Orders:  D/C to Home


Scheduled


Aspirin (Aspirin) 81 Mg Tab.chew, 4 TAB PO DAILY for anticoag for 30 Days, #120 

Ref 11


   Prescribed by: WALKER KANG MD on 5/27/21 1612


Atorvastatin Calcium (Atorvastatin Calcium) 40 Mg Tablet, 1 TAB PO DAILY for CVA

for 30 Days, #30 Ref 5


   Prescribed by: WALKER KANG MD on 5/27/21 1653


Carvedilol (Coreg  **) 6.25 Mg Tablet, 6.25 MG PO BIDWMEALS for CARDIAC, 

(Reported)


   Entered as Reported by: HUGO TRACY on 5/26/21 1517


   Last Action: Continued on 5/26/21 1701 by WALKER KANG MD


Escitalopram Oxalate (Lexapro) 5 Mg Tablet, 1 TAB PO DAILY for Depression for 30

Days, #30 Ref 2


   Prescribed by: WALKER KANG MD on 5/27/21 1740


Levothyroxine Sodium (Levothyroxine Sodium) 88 Mcg Tablet, 88 MCG PO DAILYAC for

THYROID SUPPLEMENT, #30 Ref 0 (Reported)


   Entered as Reported by: Abdelrahman Pro on 6/9/18 2026


   Last Action: Continued on 5/26/21 1342 by WALKER KANG MD


Lisinopril (Lisinopril) 40 Mg Tablet, 40 MG PO DAILY for FOR HYPERTENSION, #30 

Ref 11


   Prescribed by: PARTH NICOLAS on 4/29/16 1225


   Last Action: Continued on 5/26/21 1342 by WALKER KANG MD


Pantoprazole Sodium (Protonix) 20 Mg Tablet.dr, 40 MG PO BID, (Reported)


   Entered as Reported by: Abdelrahman Pro on 6/9/18 2026


   Last Action: Converted on 5/26/21 1342 by WALKER KANG MD





Scheduled PRN


Buspirone Hcl (Buspirone Hcl) 5 Mg Tablet, 1 TAB PO PRN BID PRN for ANXIETY / 

AGITATION for 30 Days, #60 Ref 2


   Prescribed by: WALKER KANG MD on 5/27/21 1740





Discontinued Medications


Hydrochlorothiazide (Hydrochlorothiazide Tablet) 12.5 Mg Tablet, 1 TAB PO DAILY,

#30 Ref 11


   Prescribed by: PARTH NICOLAS on 4/29/16 1225


   Last Action: HELD on 5/26/21 1341 by WALKER KANG MD


Meclizine Hcl (Meclizine Hcl) 12.5 Mg Tablet, 1 TAB PO TID for Dizziness, #30 

(Reported)


   Entered as Reported by: FAIZAN HUMPHREY RN on 12/2/19 1715





Justicifation of Admission Dx:


Justifications for Admission:


Justification of Admission Dx:  Yes


Stroke - Ischemic:  Stroke-Ischemic











WALKER KANG MD        May 27, 2021 13:23

## 2021-05-27 NOTE — NUR
Patient education given. IV and telemonitor discontinued by Jasen MANTILLA. Wheeled to main 
entrance with wife by Morenita MANTILLA to main entrance.

## 2021-05-27 NOTE — PDOC
[employer] PROGRESS NOTE


Date of Service


DOS:


DATE: 5/27/21 


TIME: 07:42





Chief Complaint


Chief Complaint


A/P:


Acute CVA - with Right sided numbness and weakness - Focus of acute infarct in 

the left posterior frontal region., will given full dose ASA and statin. 

PT/OT/SLP evaluation completed.


Hyponatremia - likely symptomatic and acute, will correct with normal saline. 

Likely hypovolemic, no sign of CHF. given his PET scan today, also concerning 

for possible SCLC paraneoplastic phenomenon vs SIADH. Will trend. Check serum 

and urine osm


Dysphagia - long standing with multiple prior surgeries.


HTN - cont coreg, losartan


Hyperlipidemia - statin


Hypothyroidism - cont levothyroxine, check TSH


CAD s/p CABG - cont meds


Abnormal chest CT - f/u PET scan results show enlarged mass in right middle lobe

on his mychart. Advised to follow up urgently with [facility]





FEN - General diet


PPX - heparin


FULL CODE


Dispo - inpatient





History of Present Illness


History of Present Illness


Mr Hays is a 65-year-old male w/ PMHx HTN, hypothyroidism, CAD s/p CABG, GERD,

achalasia who arrives via private vehicle to the emergency department 

complaining of right upper extremity numbness, weakness and bilateral leg 

numbness.  Patient reports he awoke with the symptoms and continues to have 

them. He did not have these symptoms prior to going to bed on 5/25/2021. He went

to a PET scan at [facility] for concern for lung nodules and noted this was a priority

to him. Patient was instructed by family to seek out evaluation in the emergency

department for possible stroke.  Patient denies any dizziness or 

lightheadedness.


CT head with no acute abnormality


EKG NSR rate of 69bpm, meets criteria for LVH


Labs with WBC 6.7, Hb 13.1, platelets 203, Na 129, K 3.6, CR 0.7, glucose 121, 

troponin negative


Admitted for further care.





MRI confirms acute CVA.  He is able to view his PET scan results that show a 

large mass in the right middle lobe of his lung.  His right-sided numbness is 

slightly improved.  Awaiting echocardiogram results prior to discharge.  I 

discussed with neurology





Vitals/I&O


Vitals/I&O:





                                   Vital Signs








  Date Time  Temp Pulse Resp B/P (MAP) Pulse Ox O2 Delivery O2 Flow Rate FiO2


 


5/27/21 03:55 98.0 63 18 136/72 (93) 95 Room Air  





 98.0       














                                    I & O  0 


 


 5/26/21 5/26/21 5/27/21





 15:00 23:00 07:00


 


Intake Total 0 ml 1037 ml 500 ml


 


Output Total 375 ml 650 ml 1100 ml


 


Balance -375 ml 387 ml -600 ml











Physical Exam


General:  Alert, Oriented X3, Cooperative, No acute distress


Lungs:  Clear


Abdomen:  Normal bowel sounds, Soft, No tenderness, No hepatosplenomegaly, No 

masses


Extremities:  No clubbing, No cyanosis, No edema, Normal pulses, No tendern

ess/swelling


Skin:  No rashes, No breakdown, No significant lesion





Labs


Labs:





Laboratory Tests








Test


 5/26/21


08:43 5/26/21


08:50 5/26/21


09:50 5/26/21


09:54


 


Glucose (Fingerstick)


 125 mg/dL


(70-99) 


 


 





 


White Blood Count


 


 6.7 x10^3/uL


(4.0-11.0) 


 





 


Red Blood Count


 


 4.46 x10^6/uL


(4.30-5.70) 


 





 


Hemoglobin


 


 13.1 g/dL


(13.0-17.5) 


 





 


Hematocrit


 


 37.8 %


(39.0-53.0) 


 





 


Mean Corpuscular Volume  85 fL ()   


 


Mean Corpuscular Hemoglobin  29 pg (25-35)   


 


Mean Corpuscular Hemoglobin


Concent 


 35 g/dL


(31-37) 


 





 


Red Cell Distribution Width


 


 13.2 %


(11.5-14.5) 


 





 


Platelet Count


 


 203 x10^3/uL


(140-400) 


 





 


Neutrophils (%) (Auto)  59 % (31-73)   


 


Lymphocytes (%) (Auto)  24 % (24-48)   


 


Monocytes (%) (Auto)  14 % (0-9)   


 


Eosinophils (%) (Auto)  3 % (0-3)   


 


Basophils (%) (Auto)  1 % (0-3)   


 


Neutrophils # (Auto)


 


 4.0 x10^3/uL


(1.8-7.7) 


 





 


Lymphocytes # (Auto)


 


 1.6 x10^3/uL


(1.0-4.8) 


 





 


Monocytes # (Auto)


 


 0.9 x10^3/uL


(0.0-1.1) 


 





 


Eosinophils # (Auto)


 


 0.2 x10^3/uL


(0.0-0.7) 


 





 


Basophils # (Auto)


 


 0.1 x10^3/uL


(0.0-0.2) 


 





 


Sodium Level


 


 129 mmol/L


(136-145) 


 





 


Potassium Level


 


 3.6 mmol/L


(3.5-5.1) 


 





 


Chloride Level


 


 93 mmol/L


() 


 





 


Carbon Dioxide Level


 


 31 mmol/L


(21-32) 


 





 


Anion Gap  5 (6-14)   


 


Blood Urea Nitrogen


 


 14 mg/dL


(8-26) 


 





 


Creatinine


 


 0.7 mg/dL


(0.7-1.3) 


 





 


Estimated GFR


(Cockcroft-Gault) 


 113.5 


 


 





 


Glucose Level


 


 121 mg/dL


(70-99) 


 





 


Calcium Level


 


 8.5 mg/dL


(8.5-10.1) 


 





 


Phosphorus Level


 


 3.0 mg/dL


(2.6-4.7) 


 





 


Magnesium Level


 


 1.8 mg/dL


(1.8-2.4) 


 





 


Troponin I Quantitative


 


 < 0.017 ng/mL


(0.000-0.055) 


 





 


Thyroid Stimulating Hormone


(TSH) 


 3.676 uIU/mL


(0.358-3.74) 


 





 


Ethyl Alcohol Level


 


 


 < 10 mg/dL


(0-10) 





 


Prothrombin Time


 


 


 


 13.2 SEC


(11.7-14.0)


 


Prothromb Time International


Ratio 


 


 


 1.0 (0.8-1.1) 





 


Activated Partial


Thromboplast Time 


 


 


 29 SEC (24-38) 





 


Test


 5/26/21


10:17 5/26/21


17:03 


 





 


Urine Opiates Screen Neg (NEG)    


 


Urine Methadone Screen Neg (NEG)    


 


Urine Barbiturates Neg (NEG)    


 


Urine Phencyclidine Screen Neg (NEG)    


 


Urine


Amphetamine/Methamphetamine Neg (NEG) 


 


 


 





 


Urine Benzodiazepines Screen Neg (NEG)    


 


Urine Cocaine Screen Neg (NEG)    


 


Urine Cannabinoids Screen Neg (NEG)    


 


Urine Ethyl Alcohol Neg (NEG)    


 


Vitamin B12 Level


 


 348 pg/mL


(247-911) 


 














Assessment and Plan


Assessmemt and Plan


Problems


Medical Problems:


(1) Hyponatremia


Status: Acute  





(2) TIA (transient ischemic attack)


Status: Acute  











Comment


Review of Relevant


I have reviewed the following items milagro (where applicable) has been applied.


Medications:





Current Medications








 Medications


  (Trade)  Dose


 Ordered  Sig/Stanton


 Route


 PRN Reason  Start Time


 Stop Time Status Last Admin


Dose Admin


 


 Sodium Chloride  1,000 ml @ 


 100 mls/hr  1X  ONCE


 IV


   5/26/21 11:45


 5/26/21 21:44 DC 5/26/21 11:45





 


 Pantoprazole


 Sodium


  (Protonix)  40 mg  BIDAC


 PO


   5/26/21 16:30


    5/26/21 17:38





 


 Magnesium Sulfate  50 ml @ 25


 mls/hr  1X  ONCE


 IV


   5/26/21 13:45


 5/26/21 15:44 DC 5/26/21 15:29





 


 Atorvastatin


 Calcium


  (Lipitor)  80 mg  QHS


 PO


   5/26/21 21:00


    5/26/21 21:13





 


 Iohexol


  (Omnipaque 350


 Mg/ml)  75 ml  1X  ONCE


 IV


   5/26/21 14:45


 5/26/21 14:46 DC 5/26/21 15:20





 


 Sodium Chloride  1,000 ml @ 


 75 mls/hr  [id number]


 IV


   5/26/21 17:00


    5/27/21 02:28





 


 Carvedilol


  (Coreg)  6.25 mg  BIDWMEALS


 PO


   5/26/21 17:00


    5/26/21 17:39





 


 Zolpidem Tartrate


  (Ambien)  5 mg  PRN QHS  PRN


 PO


 INSOMNIA  5/26/21 21:15


    5/26/21 22:43














Justifications for Admission


Other Justification














WALKER KANG MD        May 27, 2021 07:43

## 2021-05-27 NOTE — PDOC
PROGRESS NOTES


Date of Service


DATE: 5/27/21 


TIME: 11:36


Assessment


Problems


Medical Problems:


(1) Hyponatremia


Status: Acute  





(2) TIA (transient ischemic attack)


Status: Acute  





Lacunar infarct, left posterior frontal region


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 This 

reconstitutes within its mid and distal aspects and is hypoplastic. Stable 

compared to the prior CT angiogram dated 12/2/2019, mild atherosclerosis 

involving the carotid bifurcations and distal internal carotid arteries and 

distal left vertebral artery, with less than 50 percent stenosis.


Chronic dysphagia from achalasia


Chronic gait disorder from back problems and arthritis


Being worked up for possible lung cancer.


Plan


Await Echocardiogram


CT angiogram findings not clinically relevant, does not need a vascular surgery 

or interventional radiology consult 


Rehabilitation modalities


Aspirin to 325 mg daily


Lipids okay, discontinue statin


Home as soon as later today


Subjective


Feels better


Objective





Vital Signs








  Date Time  Temp Pulse Resp B/P (MAP) Pulse Ox O2 Delivery O2 Flow Rate FiO2


 


5/27/21 08:02  63  136/72    


 


5/27/21 07:00 97.6  16  98 Room Air  





 97.6       














Intake and Output 


 


 5/27/21





 07:00


 


Intake Total 1537 ml


 


Output Total 2125 ml


 


Balance -588 ml


 


 


 


Intake Oral 1537 ml


 


Output Urine Total 2125 ml








PHYSICAL EXAM


Physical Exam:


Alert. Oriented to time, place and person.


PERRL.


EOMI.


CN: Slight right central facial weakness.


Muscle tone: normal.


Muscle strength: 5/5, does have a slight right pronator drift


DTR: 2+


Plantar reflex: Flexor


Gait: not examined in bed.


Sensory exam: no abnormal findings.


No cerebellar signs elicited.


Review of Relevant


I have reviewed the following items milagro (where applicable) has been applied.


Labs





Laboratory Tests








Test


 5/26/21


08:43 5/26/21


08:50 5/26/21


09:50 5/26/21


09:54


 


Glucose (Fingerstick)


 125 mg/dL


(70-99) 


 


 





 


White Blood Count


 


 6.7 x10^3/uL


(4.0-11.0) 


 





 


Red Blood Count


 


 4.46 x10^6/uL


(4.30-5.70) 


 





 


Hemoglobin


 


 13.1 g/dL


(13.0-17.5) 


 





 


Hematocrit


 


 37.8 %


(39.0-53.0) 


 





 


Mean Corpuscular Volume  85 fL ()   


 


Mean Corpuscular Hemoglobin  29 pg (25-35)   


 


Mean Corpuscular Hemoglobin


Concent 


 35 g/dL


(31-37) 


 





 


Red Cell Distribution Width


 


 13.2 %


(11.5-14.5) 


 





 


Platelet Count


 


 203 x10^3/uL


(140-400) 


 





 


Neutrophils (%) (Auto)  59 % (31-73)   


 


Lymphocytes (%) (Auto)  24 % (24-48)   


 


Monocytes (%) (Auto)  14 % (0-9)   


 


Eosinophils (%) (Auto)  3 % (0-3)   


 


Basophils (%) (Auto)  1 % (0-3)   


 


Neutrophils # (Auto)


 


 4.0 x10^3/uL


(1.8-7.7) 


 





 


Lymphocytes # (Auto)


 


 1.6 x10^3/uL


(1.0-4.8) 


 





 


Monocytes # (Auto)


 


 0.9 x10^3/uL


(0.0-1.1) 


 





 


Eosinophils # (Auto)


 


 0.2 x10^3/uL


(0.0-0.7) 


 





 


Basophils # (Auto)


 


 0.1 x10^3/uL


(0.0-0.2) 


 





 


Sodium Level


 


 129 mmol/L


(136-145) 


 





 


Potassium Level


 


 3.6 mmol/L


(3.5-5.1) 


 





 


Chloride Level


 


 93 mmol/L


() 


 





 


Carbon Dioxide Level


 


 31 mmol/L


(21-32) 


 





 


Anion Gap  5 (6-14)   


 


Blood Urea Nitrogen


 


 14 mg/dL


(8-26) 


 





 


Creatinine


 


 0.7 mg/dL


(0.7-1.3) 


 





 


Estimated GFR


(Cockcroft-Gault) 


 113.5 


 


 





 


Glucose Level


 


 121 mg/dL


(70-99) 


 





 


Calcium Level


 


 8.5 mg/dL


(8.5-10.1) 


 





 


Phosphorus Level


 


 3.0 mg/dL


(2.6-4.7) 


 





 


Magnesium Level


 


 1.8 mg/dL


(1.8-2.4) 


 





 


Troponin I Quantitative


 


 < 0.017 ng/mL


(0.000-0.055) 


 





 


Thyroid Stimulating Hormone


(TSH) 


 3.676 uIU/mL


(0.358-3.74) 


 





 


Ethyl Alcohol Level


 


 


 < 10 mg/dL


(0-10) 





 


Prothrombin Time


 


 


 


 13.2 SEC


(11.7-14.0)


 


Prothromb Time International


Ratio 


 


 


 1.0 (0.8-1.1) 





 


Activated Partial


Thromboplast Time 


 


 


 29 SEC (24-38) 





 


Test


 5/26/21


10:17 5/26/21


17:03 5/27/21


07:00 





 


Urine Opiates Screen Neg (NEG)    


 


Urine Methadone Screen Neg (NEG)    


 


Urine Barbiturates Neg (NEG)    


 


Urine Phencyclidine Screen Neg (NEG)    


 


Urine


Amphetamine/Methamphetamine Neg (NEG) 


 


 


 





 


Urine Benzodiazepines Screen Neg (NEG)    


 


Urine Cocaine Screen Neg (NEG)    


 


Urine Cannabinoids Screen Neg (NEG)    


 


Urine Ethyl Alcohol Neg (NEG)    


 


Vitamin B12 Level


 


 348 pg/mL


(247-911) 


 





 


Triglycerides Level


 


 


 85 mg/dL


(0-150) 





 


Cholesterol Level


 


 


 83 mg/dL


(0-200) 





 


LDL Cholesterol, Calculated


 


 


 32 mg/dL


(0-100) 





 


VLDL Cholesterol, Calculated


 


 


 17 mg/dL


(0-40) 





 


Non-HDL Cholesterol Calculated


 


 


 49 mg/dL


(0-129) 





 


HDL Cholesterol


 


 


 34 mg/dL


(40-60) 





 


Cholesterol/HDL Ratio   2.4  








Laboratory Tests








Test


 5/26/21


17:03 5/27/21


07:00


 


Vitamin B12 Level


 348 pg/mL


(247-911) 





 


Triglycerides Level


 


 85 mg/dL


(0-150)


 


Cholesterol Level


 


 83 mg/dL


(0-200)


 


LDL Cholesterol, Calculated


 


 32 mg/dL


(0-100)


 


VLDL Cholesterol, Calculated


 


 17 mg/dL


(0-40)


 


Non-HDL Cholesterol Calculated


 


 49 mg/dL


(0-129)


 


HDL Cholesterol


 


 34 mg/dL


(40-60)


 


Cholesterol/HDL Ratio  2.4 








Medications





Current Medications


Ondansetron HCl (Zofran) 4 mg PRN Q8HRS  PRN IV NAUSEA/VOMITING;  Start 5/26/21 

at 10:15;  Stop 5/26/21 at 13:41;  Status DC


Sodium Chloride 1,000 ml @  100 mls/hr 1X  ONCE IV  Last administered on 

5/26/21at 11:45;  Start 5/26/21 at 11:45;  Stop 5/26/21 at 21:44;  Status DC


Ondansetron HCl (Zofran) 4 mg PRN Q4HRS  PRN IV NAUSEA/VOMITING;  Start 5/26/21 

at 13:45


Levothyroxine Sodium (Synthroid) 88 mcg DAILYAC PO  Last administered on 

5/27/21at 08:02;  Start 5/27/21 at 07:30


Lisinopril (Prinivil) 40 mg DAILY PO  Last administered on 5/27/21at 08:02;  

Start 5/27/21 at 09:00


Pantoprazole Sodium (Protonix) 40 mg BIDAC PO  Last administered on 5/27/21at 

08:01;  Start 5/26/21 at 16:30


Magnesium Sulfate 50 ml @ 25 mls/hr 1X  ONCE IV  Last administered on 5/26/21at 

15:29;  Start 5/26/21 at 13:45;  Stop 5/26/21 at 15:44;  Status DC


Atorvastatin Calcium (Lipitor) 80 mg QHS PO  Last administered on 5/26/21at 

21:13;  Start 5/26/21 at 21:00


Acetaminophen (Tylenol) 650 mg PRN Q6HRS  PRN PO MILD PAIN / TEMP > 100.3'F;  

Start 5/26/21 at 14:30


Aspirin (Ecotrin) 325 mg [id number] PO  Last administered on 5/27/21at 08:02;  

Start 5/27/21 at 08:00


Aspirin (Aspirin Rectal Supp) 300 mg PRN DAILY  PRN DE IF UNABLE TO TAKE PO;  

Start 5/26/21 at 14:30


Iohexol (Omnipaque 350 Mg/ml) 75 ml 1X  ONCE IV  Last administered on 5/26/21at 

15:20;  Start 5/26/21 at 14:45;  Stop 5/26/21 at 14:46;  Status DC


Info (CONTRAST GIVEN -- Rx MONITORING) 1 each PRN DAILY  PRN MC SEE COMMENTS;  

Start 5/26/21 at 14:45;  Stop 5/28/21 at 14:44


Sodium Chloride 1,000 ml @  75 mls/hr [id number] IV  Last administered on 5/27/21at

02:28;  Start 5/26/21 at 17:00


Carvedilol (Coreg) 6.25 mg BIDWMEALS PO  Last administered on 5/27/21at 08:02;  

Start 5/26/21 at 17:00


Zolpidem Tartrate (Ambien) 5 mg PRN QHS  PRN PO INSOMNIA Last administered on 

5/26/21at 22:43;  Start 5/26/21 at 21:15


Cyanocobalamin (Vitamin B-12 Inj) 1,000 mcg 1X  ONCE IM ;  Start 5/27/21 at 

07:15;  Stop 5/27/21 at 05:05;  Status DC


Cyanocobalamin (Vitamin B-12 Inj) 1,000 mcg 1X  ONCE IM  Last administered on 

5/27/21at 08:01;  Start 5/27/21 at 09:00;  Stop 5/27/21 at 09:01;  Status DC





Active Scripts


Active


Hydrochlorothiazide Tablet (Hydrochlorothiazide) 12.5 Mg Tablet 1 Tab PO DAILY


Lisinopril 40 Mg Tablet 40 Mg PO DAILY


Reported


Aspirin 81 Mg Tab.chew 1 Tab PO DAILY


Coreg  ** (Carvedilol) 6.25 Mg Tablet 6.25 Mg PO BIDWMEALS


Meclizine Hcl 12.5 Mg Tablet 1 Tab PO TID


Protonix (Pantoprazole Sodium) 20 Mg Tablet.dr 40 Mg PO BID


Levothyroxine Sodium 88 Mcg Tablet 88 Mcg PO DAILYAC


Vitals/I & O





Vital Sign - Last 24 Hours








 5/26/21 5/26/21 5/26/21 5/26/21





 13:27 15:00 17:39 19:58


 


Temp  98.1  98.1





  98.1  98.1


 


Pulse  75 67 73


 


Resp  16  16


 


B/P (MAP)  140/76 (97) 118/82 129/78 (95)


 


Pulse Ox  94  97


 


O2 Delivery Room Air Room Air  Room Air


 


    





    





 5/26/21 5/26/21 5/27/21 5/27/21





 20:00 22:37 03:55 07:00


 


Temp  97.9 98.0 97.6





  97.9 98.0 97.6


 


Pulse  63 63 64


 


Resp  16 18 16


 


B/P (MAP)  118/57 (77) 136/72 (93) 155/89 (111)


 


Pulse Ox  97 95 98


 


O2 Delivery Room Air Room Air Room Air Room Air


 


    





    





 5/27/21 5/27/21  





 08:02 08:02  


 


Pulse 63 63  


 


B/P (MAP) 136/72 136/72  














Intake and Output   


 


 5/26/21 5/26/21 5/27/21





 15:00 23:00 07:00


 


Intake Total 0 ml 1037 ml 500 ml


 


Output Total 375 ml 650 ml 1100 ml


 


Balance -375 ml 387 ml -600 ml








Images


MRI BRAIN WO 





Date: 5/27/2021 9:30 AM 





Indication:  right-sided numbness; also possible new lung ca  





Comparison:  CT 5/26/2021. MRI 12/2/2019. 





Technique: Multiplanar multisequence MRI of the brain was performed without 

intravenous contrast using the standard protocol.





Findings: 





Focus of restricted diffusion in the left posterior frontal region. No acute 

hemorrhage. Multiple scattered foci of gradient susceptibility artifact at the 

gray-white interface, deep gray structures, and brainstem. The ventricles are 

normal in size and configuration without hydrocephalus. Moderate scattered FLAIR

hyperintensities in the subcortical and periventricular deep white matter, a 

nonspecific finding, most commonly seen with chronic small vessel ischemic 

disease. Mild generalized cerebral volume loss.





Multiple bilateral corona radiata/centrum semiovale chronic lacunar infarcts.





The scalp and calvarium are normal. The pituitary and sella are normal. No 

Chiari malformation. The visualized upper cervical spine is normal.





The visualized orbits and globes are normal. The visualized paranasal sinuses 

are clear. The mastoid air cells are clear.





Abnormal intracranial right vertebral artery flow-void may relate to slow flow, 

as this vessel is seen to be patent on CTA from one-day prior.





IMPRESSION:


1. Focus of acute infarct in the left posterior frontal region. No acute 

hemorrhage.





2. Multiple scattered foci of gradient susceptibility artifact consistent with 

chronic microhemorrhages, likely hypertensive in etiology and/or cerebral 

amyloid angiopathy.





3. Multiple bilateral corona radiata/centrum semiovale chronic lacunar infarcts.





4. Moderate chronic small vessel ischemic disease and mild cerebral volume loss.





CT angiogram of the head and neck with intravenous contrast.





HISTORY: Stroke.





TECHNIQUE: Computed tomographic images the head and neck were obtained with 

intravenous contrast. 3-dimensional images were obtained. 


*One or more of the following individualized dose reduction techniques were 

utilized for this examination:  


1. Automated exposure control.  


2. Adjustment of the mA and/or kV according to patient size.  


3. Use of iterative reconstruction technique.





COMPARISON: Noncontrast head CT performed 5/26/2021, MRI dated 12/2/2019 and CT 

angiogram dated 12/2/2019.





FINDINGS: The aortic arch is normal in caliber. There is a standard aortic arch 

branching pattern. There is atherosclerosis involving the arch great vessels, 

with less than 50 percent stenosis. There is also atherosclerosis involving the 

carotid bifurcations with less than 50 percent stenosis. There is mild segmental

asymmetry in the caliber of the left greater than right A2 segments. The 

anterior communicating artery is patent. There is a patent left posterior 

communicating artery. There is a prominent right posterior communicating artery 

with hypoplastic P1 segment or fetal right posterior cerebral artery, a normal 

variant. The basilar artery is widely patent.





There is occlusion of the proximal right vertebral artery. This reconstitutes 

within its mid aspect. There is minimal atherosclerosis involving the distal 

left vertebral artery the skull base, without significant stenosis. The left 

vertebral artery is dominant.





There is pulmonary emphysema. There is central bronchial wall thickening likely 

due to the sequela of bronchitis. There is groundglass and nodular right suprah

ilar opacity, possibly postinfectious or postinflammatory in etiology. There is 

no pneumothorax. There is no neck lymphadenopathy.





There is cerebral atrophy with compensatory enlargement of the ventricles. There

are cerebral white matter changes, likely due to chronic small vessel disease. 

There is no suspicious enhancing lesion. There is no mass effect or midline 

shift. There is no hydrocephalus. There is evidence of lens surgery. The 

paranasal sinuses are unremarkable. There is a small amount of mastoid fluid. 

There is degenerative change throughout the cervical spine. This results in 

foraminal stenosis at multiple levels.





IMPRESSION:





1. 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 This 

reconstitutes within its mid and distal aspects and is hypoplastic. Stable 

compared to the prior CT angiogram dated 12/2/2019.


2. Mild atherosclerosis involving the carotid bifurcations and distal internal 

carotid arteries and distal left vertebral artery, with less than 50 percent 

stenosis.


3. No acute intracranial finding. Note is made that MRI is more sensitive for 

acute infarction.


4. Cerebral atrophy.


5. Cerebral white matter changes, likely due to chronic small vessel disease.


6. Pulmonary emphysema with central bronchial wall thickening and stable right 

suprahilar nodular and groundglass opacity, likely postinfectious or 

postinflammatory in etiology.





Justicifation of Admission Dx:


Justifications for Admission:


Justification of Admission Dx:  Yes


Stroke - Ischemic:  Stroke-Ischemic











BIBI VALLADARES MD           May 27, 2021 11:41

## 2021-05-27 NOTE — RAD
MRI BRAIN WO 



Date: 5/27/2021 9:30 AM 



Indication:  right-sided numbness; also possible new lung ca  



Comparison:  CT 5/26/2021. MRI 12/2/2019. 



Technique: Multiplanar multisequence MRI of the brain was performed without intravenous contrast usin
g the standard protocol.



Findings: 



Focus of restricted diffusion in the left posterior frontal region. No acute hemorrhage. Multiple sca
ttered foci of gradient susceptibility artifact at the gray-white interface, deep gray structures, an
d brainstem. The ventricles are normal in size and configuration without hydrocephalus. Moderate scat
tered FLAIR hyperintensities in the subcortical and periventricular deep white matter, a nonspecific 
finding, most commonly seen with chronic small vessel ischemic disease. Mild generalized cerebral vol
ume loss.



Multiple bilateral corona radiata/centrum semiovale chronic lacunar infarcts.



The scalp and calvarium are normal. The pituitary and sella are normal. No Chiari malformation. The v
isualized upper cervical spine is normal.



The visualized orbits and globes are normal. The visualized paranasal sinuses are clear. The mastoid 
air cells are clear.



Abnormal intracranial right vertebral artery flow-void may relate to slow flow, as this vessel is see
n to be patent on CTA from one-day prior.



IMPRESSION:

1. Focus of acute infarct in the left posterior frontal region. No acute hemorrhage.



2. Multiple scattered foci of gradient susceptibility artifact consistent with chronic microhemorrhag
es, likely hypertensive in etiology and/or cerebral amyloid angiopathy.



3. Multiple bilateral corona radiata/centrum semiovale chronic lacunar infarcts.



4. Moderate chronic small vessel ischemic disease and mild cerebral volume loss.





**********FOR INTERNAL CODING PURPOSES**********



Critical result:



Findings discussed with the patient's nurse, Em, at 5/27/2021 10:34 AM.



RESULT CODE: (C)  



  







Electronically signed by: Jg Yap MD (5/27/2021 10:36 AM) [id number]

## 2021-05-27 NOTE — NUR
SS following for discharge planning. SS reviewed pt chart and discussed with pt RN. Pt is 
from home with spouse and is currently on room air. Brain MRI and ECHO ordered. Discharge 
plan is to home when medically ready. SS will continue to follow for discharge planning.

## 2021-11-11 ENCOUNTER — WEB ENCOUNTER (OUTPATIENT)
Dept: URBAN - METROPOLITAN AREA CLINIC 113 | Facility: CLINIC | Age: 66
End: 2021-11-11

## 2021-11-11 ENCOUNTER — OFFICE VISIT (OUTPATIENT)
Dept: URBAN - METROPOLITAN AREA CLINIC 113 | Facility: CLINIC | Age: 66
End: 2021-11-11
Payer: MEDICARE

## 2021-11-11 VITALS
SYSTOLIC BLOOD PRESSURE: 144 MMHG | WEIGHT: 193.6 LBS | RESPIRATION RATE: 20 BRPM | BODY MASS INDEX: 30.39 KG/M2 | HEART RATE: 50 BPM | TEMPERATURE: 98.2 F | DIASTOLIC BLOOD PRESSURE: 77 MMHG | HEIGHT: 67 IN

## 2021-11-11 DIAGNOSIS — K60.2 ANAL FISSURE: ICD-10-CM

## 2021-11-11 PROCEDURE — 99204 OFFICE O/P NEW MOD 45 MIN: CPT | Performed by: NURSE PRACTITIONER

## 2021-11-11 RX ORDER — HYDROCODONE BITARTRATE AND ACETAMINOPHEN 10; 325 MG/1; MG/1
TAKE 1 TABLET EVERY 8 HOURS AS NEEDED FOR PAIN TABLET ORAL
Qty: 18 | Refills: 0 | Status: ON HOLD | COMMUNITY

## 2021-11-11 RX ORDER — ESOMEPRAZOLE MAGNESIUM 40 MG
TAKE 1 CAPSULE DAILY CAPSULE,DELAYED RELEASE (ENTERIC COATED) ORAL
Qty: 90 | Refills: 3 | Status: ACTIVE | COMMUNITY

## 2021-11-11 RX ORDER — FENOFIBRATE 145 MG/1
TAKE 1 TABLET DAILY TABLET, FILM COATED ORAL
Refills: 0 | Status: ON HOLD | COMMUNITY

## 2021-11-11 RX ORDER — AMLODIPINE BESYLATE AND BENAZEPRIL HYDROCHLORIDE 5; 20 MG/1; MG/1
AS DIRECTED CAPSULE ORAL
Status: ACTIVE | COMMUNITY

## 2021-11-11 RX ORDER — ASPIRIN 81 MG
TAKE 1 TABLET DAILY TABLET, DELAYED RELEASE (ENTERIC COATED) ORAL
Refills: 0 | Status: ACTIVE | COMMUNITY

## 2021-11-11 RX ORDER — DULOXETINE 60 MG/1
1 CAPSULE CAPSULE, DELAYED RELEASE PELLETS ORAL ONCE A DAY
Status: ACTIVE | COMMUNITY

## 2021-11-11 RX ORDER — IBUPROFEN 800 MG/1
TAKE 1 TABLET 3 TIMES DAILY AS NEEDED TABLET ORAL
Refills: 0 | Status: ON HOLD | COMMUNITY

## 2021-11-11 RX ORDER — TRAZODONE HYDROCHLORIDE 50 MG/1
1 TABLET AT BEDTIME AS NEEDED TABLET, FILM COATED ORAL ONCE A DAY
Status: ACTIVE | COMMUNITY

## 2021-11-11 RX ORDER — LISINOPRIL 20 MG/1
TAKE 1 TABLET DAILY TABLET ORAL
Refills: 0 | Status: ACTIVE | COMMUNITY

## 2021-11-11 RX ORDER — ATORVASTATIN CALCIUM 40 MG/1
TAKE 1 TABLET DAILY TABLET, FILM COATED ORAL
Refills: 0 | Status: ACTIVE | COMMUNITY

## 2021-11-11 RX ORDER — MORPHINE SULFATE 10 MG/1
TAKE 1 CAPSULE 3 TIMES DAILY CAPSULE, EXTENDED RELEASE ORAL
Refills: 0 | Status: ON HOLD | COMMUNITY

## 2021-11-11 RX ORDER — DOCUSATE SODIUM 100 MG/1
TAKE 1 CAPSULE TWICE DAILY AS NEEDED CAPSULE, LIQUID FILLED ORAL
Refills: 0 | Status: ACTIVE | COMMUNITY

## 2021-11-11 NOTE — HPI-TODAY'S VISIT:
65 yo male with a history of chronic back pain on long-term narcotics, CAD, MI x 2 (in 2000 and 2005) status post stenting, presenting for evaluation of rectal burning. A colonosocpy in 2014 was notable for a good bowel preparation, nonbleeding internal hemorrhoids, diverticulosis and the removal of a 5 mm polyp from the descending colon. Pathology showed hyperplastic tissue; a repeat colonoscopy is recommended in 2024.  He thinks he may be having some issues with an internal hemorrhoid. He tells me that there is burning and pain near the rectum, which makes him very irritable. He has been having some bright red blood per rectum that is on the tissue only, and occurring intermittently. He has tried Preparation H, a cortisone-lidocaine cream over the counter which provide very short term relief. Sweating causes increased burning. He has bowel movements once or twice daily. Stools are soft using stool softener as needed. There is no abdominal pain, nausea or vomiting. Weight is stable. Appetite is fair. No heartburn or dysphagia.

## 2022-02-02 ENCOUNTER — CLAIMS CREATED FROM THE CLAIM WINDOW (OUTPATIENT)
Dept: URBAN - METROPOLITAN AREA CLINIC 113 | Facility: CLINIC | Age: 67
End: 2022-02-02
Payer: MEDICARE

## 2022-02-02 VITALS
SYSTOLIC BLOOD PRESSURE: 123 MMHG | DIASTOLIC BLOOD PRESSURE: 72 MMHG | BODY MASS INDEX: 29.82 KG/M2 | WEIGHT: 190 LBS | TEMPERATURE: 98.4 F | HEIGHT: 67 IN | HEART RATE: 52 BPM

## 2022-02-02 DIAGNOSIS — L29.0 PRURITUS ANI: ICD-10-CM

## 2022-02-02 PROCEDURE — 99213 OFFICE O/P EST LOW 20 MIN: CPT | Performed by: NURSE PRACTITIONER

## 2022-02-02 RX ORDER — IBUPROFEN 800 MG/1
TAKE 1 TABLET 3 TIMES DAILY AS NEEDED TABLET ORAL
Refills: 0 | Status: ON HOLD | COMMUNITY

## 2022-02-02 RX ORDER — AMLODIPINE BESYLATE AND BENAZEPRIL HYDROCHLORIDE 5; 20 MG/1; MG/1
AS DIRECTED CAPSULE ORAL
Status: ACTIVE | COMMUNITY

## 2022-02-02 RX ORDER — TRAZODONE HYDROCHLORIDE 50 MG/1
1 TABLET AT BEDTIME AS NEEDED TABLET, FILM COATED ORAL ONCE A DAY
Status: ACTIVE | COMMUNITY

## 2022-02-02 RX ORDER — FENOFIBRATE 145 MG/1
TAKE 1 TABLET DAILY TABLET, FILM COATED ORAL
Refills: 0 | Status: ON HOLD | COMMUNITY

## 2022-02-02 RX ORDER — MORPHINE SULFATE 10 MG/1
TAKE 1 CAPSULE 3 TIMES DAILY CAPSULE, EXTENDED RELEASE ORAL
Refills: 0 | Status: ON HOLD | COMMUNITY

## 2022-02-02 RX ORDER — ASPIRIN 81 MG
TAKE 1 TABLET DAILY TABLET, DELAYED RELEASE (ENTERIC COATED) ORAL
Refills: 0 | Status: ACTIVE | COMMUNITY

## 2022-02-02 RX ORDER — ESOMEPRAZOLE MAGNESIUM 40 MG
TAKE 1 CAPSULE DAILY CAPSULE,DELAYED RELEASE (ENTERIC COATED) ORAL
Qty: 90 | Refills: 3 | Status: ACTIVE | COMMUNITY

## 2022-02-02 RX ORDER — DULOXETINE 60 MG/1
1 CAPSULE CAPSULE, DELAYED RELEASE PELLETS ORAL ONCE A DAY
Status: ACTIVE | COMMUNITY

## 2022-02-02 RX ORDER — DOCUSATE SODIUM 100 MG/1
TAKE 1 CAPSULE TWICE DAILY AS NEEDED CAPSULE, LIQUID FILLED ORAL
Refills: 0 | Status: ACTIVE | COMMUNITY

## 2022-02-02 RX ORDER — ATORVASTATIN CALCIUM 40 MG/1
TAKE 1 TABLET DAILY TABLET, FILM COATED ORAL
Refills: 0 | Status: ACTIVE | COMMUNITY

## 2022-02-02 RX ORDER — HYDROCODONE BITARTRATE AND ACETAMINOPHEN 10; 325 MG/1; MG/1
TAKE 1 TABLET EVERY 8 HOURS AS NEEDED FOR PAIN TABLET ORAL
Qty: 18 | Refills: 0 | Status: ON HOLD | COMMUNITY

## 2022-02-02 RX ORDER — LISINOPRIL 20 MG/1
TAKE 1 TABLET DAILY TABLET ORAL
Refills: 0 | Status: ACTIVE | COMMUNITY

## 2022-02-02 NOTE — HPI-TODAY'S VISIT:
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 He was seen in the office on 11/11/2021 with complaints of burning and pain near the rectum making him very irritable associated with some bright red blood per rectum.  He tried Preparation H cortisone lidocaine cream over-the-counter which provided short-term relief.  Sweating caused increased burning.  Rectal examination revealed a posterior anal fissure.  This was treated topically with nifedipine and lidocaine cream with consideration of a referral to colorectal surgery if not improved.  He complains of itching irritation in the perianal region which can sometimes cause him to have a burning sensation. He sometimes tries to scratch the area with clean toilet tissue. No blood on the tissue any longer. He did use the nifedipine/lidocaine cream for anal fissure.

## 2022-02-02 NOTE — PHYSICAL EXAM RECTAL:
normal tone , no external hemorrhoids however there is an anterior skin tag that is not inflamed or thrombosed. Perianal exam reveals a damp, moist erythematous area on bilateral buttocks. Anal fissure appears healed.

## 2023-03-27 ENCOUNTER — TELEPHONE ENCOUNTER (OUTPATIENT)
Dept: URBAN - METROPOLITAN AREA CLINIC 72 | Facility: CLINIC | Age: 68
End: 2023-03-27

## 2023-03-27 ENCOUNTER — OFFICE VISIT (OUTPATIENT)
Dept: URBAN - METROPOLITAN AREA CLINIC 72 | Facility: CLINIC | Age: 68
End: 2023-03-27
Payer: MEDICARE

## 2023-03-27 VITALS
HEART RATE: 57 BPM | SYSTOLIC BLOOD PRESSURE: 132 MMHG | TEMPERATURE: 98.2 F | BODY MASS INDEX: 29.98 KG/M2 | DIASTOLIC BLOOD PRESSURE: 73 MMHG | HEIGHT: 67 IN | WEIGHT: 191 LBS

## 2023-03-27 DIAGNOSIS — K64.4 SKIN TAG OF RECTUM: ICD-10-CM

## 2023-03-27 DIAGNOSIS — L29.0 RECTAL ITCHING: ICD-10-CM

## 2023-03-27 PROCEDURE — 99214 OFFICE O/P EST MOD 30 MIN: CPT | Performed by: NURSE PRACTITIONER

## 2023-03-27 PROCEDURE — 46600 DIAGNOSTIC ANOSCOPY SPX: CPT | Performed by: NURSE PRACTITIONER

## 2023-03-27 RX ORDER — IBUPROFEN 800 MG/1
TAKE 1 TABLET 3 TIMES DAILY AS NEEDED TABLET ORAL
Refills: 0 | Status: ON HOLD | COMMUNITY

## 2023-03-27 RX ORDER — TRAZODONE HYDROCHLORIDE 50 MG/1
1 TABLET AT BEDTIME AS NEEDED TABLET, FILM COATED ORAL ONCE A DAY
Status: ACTIVE | COMMUNITY

## 2023-03-27 RX ORDER — ESOMEPRAZOLE MAGNESIUM 40 MG
TAKE 1 CAPSULE DAILY CAPSULE,DELAYED RELEASE (ENTERIC COATED) ORAL
Qty: 90 | Refills: 3 | Status: ACTIVE | COMMUNITY

## 2023-03-27 RX ORDER — AMLODIPINE BESYLATE AND BENAZEPRIL HYDROCHLORIDE 5; 20 MG/1; MG/1
AS DIRECTED CAPSULE ORAL
Status: ACTIVE | COMMUNITY

## 2023-03-27 RX ORDER — LISINOPRIL 20 MG/1
TAKE 1 TABLET DAILY TABLET ORAL
Refills: 0 | Status: ACTIVE | COMMUNITY

## 2023-03-27 RX ORDER — ASPIRIN 81 MG
TAKE 1 TABLET DAILY TABLET, DELAYED RELEASE (ENTERIC COATED) ORAL
Refills: 0 | Status: ACTIVE | COMMUNITY

## 2023-03-27 RX ORDER — MORPHINE SULFATE 10 MG/1
TAKE 1 CAPSULE 3 TIMES DAILY CAPSULE, EXTENDED RELEASE ORAL
Refills: 0 | Status: ON HOLD | COMMUNITY

## 2023-03-27 RX ORDER — ATORVASTATIN CALCIUM 40 MG/1
TAKE 1 TABLET DAILY TABLET, FILM COATED ORAL
Refills: 0 | Status: ACTIVE | COMMUNITY

## 2023-03-27 RX ORDER — DOCUSATE SODIUM 100 MG/1
TAKE 1 CAPSULE TWICE DAILY AS NEEDED CAPSULE, LIQUID FILLED ORAL
Refills: 0 | Status: ACTIVE | COMMUNITY

## 2023-03-27 RX ORDER — FENOFIBRATE 145 MG/1
TAKE 1 TABLET DAILY TABLET, FILM COATED ORAL
Refills: 0 | Status: ON HOLD | COMMUNITY

## 2023-03-27 RX ORDER — HYDROCODONE BITARTRATE AND ACETAMINOPHEN 10; 325 MG/1; MG/1
TAKE 1 TABLET EVERY 8 HOURS AS NEEDED FOR PAIN TABLET ORAL
Qty: 18 | Refills: 0 | Status: ON HOLD | COMMUNITY

## 2023-03-27 RX ORDER — DULOXETINE 60 MG/1
1 CAPSULE CAPSULE, DELAYED RELEASE PELLETS ORAL ONCE A DAY
Status: ACTIVE | COMMUNITY

## 2023-03-27 NOTE — EXAM-PHYSICAL EXAM
Rectal/Anoscopy: Skin tag noted 5 o'clock. No obvious hemorrhoids, rectal masses, or bleeding present.  KRYSTLE: Normal sphincter tone and rectal muscle upon bearing down. No tenderness on exam.  Chaperone present.

## 2023-03-27 NOTE — HPI-TODAY'S VISIT:
67-year-old male patient of Dr. Blancas, referred by Dr. Thibodeaux for rectal pain.  He wants to switch to Dr. Clark since he only lives 10 minutes away.    Last seen 2/2/22 for pruritus ani and it was recommended Benefiber twice a day.  Colonoscopy by Dr. Blancas 2014 revealed nonbleeding external and internal hemorrhoids.  Diverticulosis of descending colon and one 5 mm polyp in descending colon which was removed.  Biopsy was hyperplastic.  History of chronic back pain on long-term narcotics, CAD, MI x 2 in 2000 and 2005 status post stenting, presenting for follow-up regarding anal fissure.  On interview today he is in office for rectal itching. Pt states he called in and Dr Duff nurse called in Nifedipine and lidocaine which helps some but the OTC goldbond helps more. He reports it hasn't improved since his last visit. Worse in summer with the heat and sweating.   He denies any constipation, diarrhea, melena or hematochezia.  No rectal pain currently.

## 2024-05-10 ENCOUNTER — CLAIMS CREATED FROM THE CLAIM WINDOW (OUTPATIENT)
Dept: URBAN - METROPOLITAN AREA CLINIC 72 | Facility: CLINIC | Age: 69
End: 2024-05-10

## 2024-05-10 ENCOUNTER — LAB OUTSIDE AN ENCOUNTER (OUTPATIENT)
Dept: URBAN - METROPOLITAN AREA CLINIC 72 | Facility: CLINIC | Age: 69
End: 2024-05-10

## 2024-05-10 ENCOUNTER — OFFICE VISIT (OUTPATIENT)
Dept: URBAN - METROPOLITAN AREA CLINIC 72 | Facility: CLINIC | Age: 69
End: 2024-05-10
Payer: MEDICARE

## 2024-05-10 ENCOUNTER — DASHBOARD ENCOUNTERS (OUTPATIENT)
Age: 69
End: 2024-05-10

## 2024-05-10 VITALS
HEIGHT: 67 IN | DIASTOLIC BLOOD PRESSURE: 70 MMHG | WEIGHT: 178.4 LBS | BODY MASS INDEX: 28 KG/M2 | SYSTOLIC BLOOD PRESSURE: 149 MMHG | HEART RATE: 46 BPM | TEMPERATURE: 97.3 F

## 2024-05-10 DIAGNOSIS — Z87.19 HISTORY OF ANAL FISSURES: ICD-10-CM

## 2024-05-10 DIAGNOSIS — Z86.010 HISTORY OF COLON POLYPS: ICD-10-CM

## 2024-05-10 DIAGNOSIS — K64.4 HEMORRHOIDS, EXTERNAL: ICD-10-CM

## 2024-05-10 DIAGNOSIS — K21.9 CHRONIC GERD: ICD-10-CM

## 2024-05-10 DIAGNOSIS — K64.8 HEMORRHOIDS, INTERNAL: ICD-10-CM

## 2024-05-10 DIAGNOSIS — K57.90 DIVERTICULAR DISEASE: ICD-10-CM

## 2024-05-10 PROBLEM — 428283002: Status: ACTIVE | Noted: 2024-05-10

## 2024-05-10 PROBLEM — 235595009: Status: ACTIVE | Noted: 2024-05-10

## 2024-05-10 PROBLEM — 266997008: Status: ACTIVE | Noted: 2024-05-10

## 2024-05-10 PROBLEM — 397881000: Status: ACTIVE | Noted: 2024-05-10

## 2024-05-10 PROBLEM — 23913003: Status: ACTIVE | Noted: 2024-05-10

## 2024-05-10 PROBLEM — 90458007: Status: ACTIVE | Noted: 2024-05-10

## 2024-05-10 PROCEDURE — 99213 OFFICE O/P EST LOW 20 MIN: CPT | Performed by: NURSE PRACTITIONER

## 2024-05-10 RX ORDER — TRAZODONE HYDROCHLORIDE 50 MG/1
1 TABLET AT BEDTIME AS NEEDED TABLET, FILM COATED ORAL ONCE A DAY
Status: ACTIVE | COMMUNITY

## 2024-05-10 RX ORDER — ASPIRIN 81 MG
TAKE 1 TABLET DAILY TABLET, DELAYED RELEASE (ENTERIC COATED) ORAL
Refills: 0 | Status: ACTIVE | COMMUNITY

## 2024-05-10 RX ORDER — DOCUSATE SODIUM 100 MG/1
TAKE 1 CAPSULE TWICE DAILY AS NEEDED CAPSULE, LIQUID FILLED ORAL
Refills: 0 | Status: ACTIVE | COMMUNITY

## 2024-05-10 RX ORDER — IBUPROFEN 800 MG/1
TAKE 1 TABLET 3 TIMES DAILY AS NEEDED TABLET ORAL
Refills: 0 | Status: ON HOLD | COMMUNITY

## 2024-05-10 RX ORDER — ATORVASTATIN CALCIUM 40 MG/1
TAKE 1 TABLET DAILY TABLET, FILM COATED ORAL
Refills: 0 | Status: ACTIVE | COMMUNITY

## 2024-05-10 RX ORDER — DULOXETINE 60 MG/1
1 CAPSULE CAPSULE, DELAYED RELEASE PELLETS ORAL ONCE A DAY
Status: ACTIVE | COMMUNITY

## 2024-05-10 RX ORDER — LISINOPRIL 20 MG/1
TAKE 1 TABLET DAILY TABLET ORAL
Refills: 0 | Status: ACTIVE | COMMUNITY

## 2024-05-10 RX ORDER — ESOMEPRAZOLE MAGNESIUM 40 MG
TAKE 1 CAPSULE DAILY CAPSULE,DELAYED RELEASE (ENTERIC COATED) ORAL
Qty: 90 | Refills: 3 | Status: ACTIVE | COMMUNITY

## 2024-05-10 RX ORDER — FENOFIBRATE 145 MG/1
TAKE 1 TABLET DAILY TABLET, FILM COATED ORAL
Refills: 0 | Status: ON HOLD | COMMUNITY

## 2024-05-10 RX ORDER — HYDROCODONE BITARTRATE AND ACETAMINOPHEN 10; 325 MG/1; MG/1
TAKE 1 TABLET EVERY 8 HOURS AS NEEDED FOR PAIN TABLET ORAL
Qty: 18 | Refills: 0 | Status: ON HOLD | COMMUNITY

## 2024-05-10 RX ORDER — MORPHINE SULFATE 10 MG/1
TAKE 1 CAPSULE 3 TIMES DAILY CAPSULE, EXTENDED RELEASE ORAL
Refills: 0 | Status: ON HOLD | COMMUNITY

## 2024-05-10 RX ORDER — AMLODIPINE BESYLATE AND BENAZEPRIL HYDROCHLORIDE 5; 20 MG/1; MG/1
AS DIRECTED CAPSULE ORAL
Status: ACTIVE | COMMUNITY

## 2024-07-15 ENCOUNTER — OFFICE VISIT (OUTPATIENT)
Dept: URBAN - METROPOLITAN AREA MEDICAL CENTER 40 | Facility: MEDICAL CENTER | Age: 69
End: 2024-07-15

## 2024-07-15 RX ORDER — AMLODIPINE BESYLATE AND BENAZEPRIL HYDROCHLORIDE 5; 20 MG/1; MG/1
AS DIRECTED CAPSULE ORAL
Status: ACTIVE | COMMUNITY

## 2024-07-15 RX ORDER — LISINOPRIL 20 MG/1
TAKE 1 TABLET DAILY TABLET ORAL
Refills: 0 | Status: ACTIVE | COMMUNITY

## 2024-07-15 RX ORDER — ESOMEPRAZOLE MAGNESIUM 40 MG
TAKE 1 CAPSULE DAILY CAPSULE,DELAYED RELEASE (ENTERIC COATED) ORAL
Qty: 90 | Refills: 3 | Status: ACTIVE | COMMUNITY

## 2024-07-15 RX ORDER — ATORVASTATIN CALCIUM 40 MG/1
TAKE 1 TABLET DAILY TABLET, FILM COATED ORAL
Refills: 0 | Status: ACTIVE | COMMUNITY

## 2024-07-15 RX ORDER — DULOXETINE 60 MG/1
1 CAPSULE CAPSULE, DELAYED RELEASE PELLETS ORAL ONCE A DAY
Status: ACTIVE | COMMUNITY

## 2024-07-15 RX ORDER — HYDROCODONE BITARTRATE AND ACETAMINOPHEN 10; 325 MG/1; MG/1
TAKE 1 TABLET EVERY 8 HOURS AS NEEDED FOR PAIN TABLET ORAL
Qty: 18 | Refills: 0 | Status: ON HOLD | COMMUNITY

## 2024-07-15 RX ORDER — IBUPROFEN 800 MG/1
TAKE 1 TABLET 3 TIMES DAILY AS NEEDED TABLET ORAL
Refills: 0 | Status: ON HOLD | COMMUNITY

## 2024-07-15 RX ORDER — MORPHINE SULFATE 10 MG/1
TAKE 1 CAPSULE 3 TIMES DAILY CAPSULE, EXTENDED RELEASE ORAL
Refills: 0 | Status: ON HOLD | COMMUNITY

## 2024-07-15 RX ORDER — ASPIRIN 81 MG
TAKE 1 TABLET DAILY TABLET, DELAYED RELEASE (ENTERIC COATED) ORAL
Refills: 0 | Status: ACTIVE | COMMUNITY

## 2024-07-15 RX ORDER — DOCUSATE SODIUM 100 MG/1
TAKE 1 CAPSULE TWICE DAILY AS NEEDED CAPSULE, LIQUID FILLED ORAL
Refills: 0 | Status: ACTIVE | COMMUNITY

## 2024-07-15 RX ORDER — FENOFIBRATE 145 MG/1
TAKE 1 TABLET DAILY TABLET, FILM COATED ORAL
Refills: 0 | Status: ON HOLD | COMMUNITY

## 2024-07-15 RX ORDER — TRAZODONE HYDROCHLORIDE 50 MG/1
1 TABLET AT BEDTIME AS NEEDED TABLET ORAL ONCE A DAY
Status: ACTIVE | COMMUNITY